# Patient Record
Sex: MALE | Race: WHITE | NOT HISPANIC OR LATINO | Employment: OTHER | URBAN - NONMETROPOLITAN AREA
[De-identification: names, ages, dates, MRNs, and addresses within clinical notes are randomized per-mention and may not be internally consistent; named-entity substitution may affect disease eponyms.]

---

## 2018-06-06 ENCOUNTER — HOSPITAL ENCOUNTER (EMERGENCY)
Facility: HOSPITAL | Age: 73
Discharge: HOME | End: 2018-06-06
Attending: INTERNAL MEDICINE | Admitting: INTERNAL MEDICINE
Payer: COMMERCIAL

## 2018-06-06 ENCOUNTER — APPOINTMENT (OUTPATIENT)
Dept: GENERAL RADIOLOGY | Facility: HOSPITAL | Age: 73
End: 2018-06-06
Attending: INTERNAL MEDICINE
Payer: COMMERCIAL

## 2018-06-06 VITALS
OXYGEN SATURATION: 98 % | TEMPERATURE: 98.9 F | HEART RATE: 68 BPM | DIASTOLIC BLOOD PRESSURE: 91 MMHG | RESPIRATION RATE: 16 BRPM | SYSTOLIC BLOOD PRESSURE: 164 MMHG

## 2018-06-06 DIAGNOSIS — R68.89 SENSATION OF SWOLLEN THROAT: ICD-10-CM

## 2018-06-06 LAB
ALBUMIN SERPL-MCNC: 3.8 G/DL (ref 3.4–5)
ALP SERPL-CCNC: 92 U/L (ref 40–150)
ALT SERPL W P-5'-P-CCNC: 30 U/L (ref 0–70)
ANION GAP SERPL CALCULATED.3IONS-SCNC: 6 MMOL/L (ref 3–14)
AST SERPL W P-5'-P-CCNC: 26 U/L (ref 0–45)
BASOPHILS # BLD AUTO: 0 10E9/L (ref 0–0.2)
BASOPHILS NFR BLD AUTO: 0.4 %
BILIRUB SERPL-MCNC: 0.3 MG/DL (ref 0.2–1.3)
BUN SERPL-MCNC: 16 MG/DL (ref 7–30)
CALCIUM SERPL-MCNC: 8.4 MG/DL (ref 8.5–10.1)
CHLORIDE SERPL-SCNC: 105 MMOL/L (ref 94–109)
CK SERPL-CCNC: 212 U/L (ref 30–300)
CO2 SERPL-SCNC: 25 MMOL/L (ref 20–32)
CREAT SERPL-MCNC: 0.96 MG/DL (ref 0.66–1.25)
CRP SERPL-MCNC: <2.9 MG/L (ref 0–8)
DIFFERENTIAL METHOD BLD: NORMAL
EOSINOPHIL # BLD AUTO: 0.2 10E9/L (ref 0–0.7)
EOSINOPHIL NFR BLD AUTO: 2.1 %
ERYTHROCYTE [DISTWIDTH] IN BLOOD BY AUTOMATED COUNT: 13.2 % (ref 10–15)
GFR SERPL CREATININE-BSD FRML MDRD: 77 ML/MIN/1.7M2
GLUCOSE SERPL-MCNC: 111 MG/DL (ref 70–99)
HCT VFR BLD AUTO: 41.6 % (ref 40–53)
HGB BLD-MCNC: 14.7 G/DL (ref 13.3–17.7)
IMM GRANULOCYTES # BLD: 0 10E9/L (ref 0–0.4)
IMM GRANULOCYTES NFR BLD: 0.1 %
INR PPP: 2.71 (ref 0.8–1.2)
LACTATE SERPL-SCNC: 0.6 MMOL/L (ref 0.4–2)
LYMPHOCYTES # BLD AUTO: 1.9 10E9/L (ref 0.8–5.3)
LYMPHOCYTES NFR BLD AUTO: 19.8 %
MCH RBC QN AUTO: 31.8 PG (ref 26.5–33)
MCHC RBC AUTO-ENTMCNC: 35.3 G/DL (ref 31.5–36.5)
MCV RBC AUTO: 90 FL (ref 78–100)
MONOCYTES # BLD AUTO: 0.8 10E9/L (ref 0–1.3)
MONOCYTES NFR BLD AUTO: 8.3 %
NEUTROPHILS # BLD AUTO: 6.6 10E9/L (ref 1.6–8.3)
NEUTROPHILS NFR BLD AUTO: 69.3 %
NRBC # BLD AUTO: 0 10*3/UL
NRBC BLD AUTO-RTO: 0 /100
PLATELET # BLD AUTO: 220 10E9/L (ref 150–450)
POTASSIUM SERPL-SCNC: 4.5 MMOL/L (ref 3.4–5.3)
PROT SERPL-MCNC: 7.8 G/DL (ref 6.8–8.8)
RBC # BLD AUTO: 4.62 10E12/L (ref 4.4–5.9)
SODIUM SERPL-SCNC: 136 MMOL/L (ref 133–144)
TROPONIN I SERPL-MCNC: <0.015 UG/L (ref 0–0.04)
WBC # BLD AUTO: 9.6 10E9/L (ref 4–11)

## 2018-06-06 PROCEDURE — 99285 EMERGENCY DEPT VISIT HI MDM: CPT | Mod: 25

## 2018-06-06 PROCEDURE — 86140 C-REACTIVE PROTEIN: CPT | Performed by: INTERNAL MEDICINE

## 2018-06-06 PROCEDURE — 25000128 H RX IP 250 OP 636: Performed by: INTERNAL MEDICINE

## 2018-06-06 PROCEDURE — 83605 ASSAY OF LACTIC ACID: CPT | Performed by: INTERNAL MEDICINE

## 2018-06-06 PROCEDURE — 80053 COMPREHEN METABOLIC PANEL: CPT | Performed by: INTERNAL MEDICINE

## 2018-06-06 PROCEDURE — 96374 THER/PROPH/DIAG INJ IV PUSH: CPT

## 2018-06-06 PROCEDURE — 82550 ASSAY OF CK (CPK): CPT | Performed by: INTERNAL MEDICINE

## 2018-06-06 PROCEDURE — 85610 PROTHROMBIN TIME: CPT | Performed by: INTERNAL MEDICINE

## 2018-06-06 PROCEDURE — 71046 X-RAY EXAM CHEST 2 VIEWS: CPT | Mod: TC

## 2018-06-06 PROCEDURE — 93005 ELECTROCARDIOGRAM TRACING: CPT

## 2018-06-06 PROCEDURE — 93010 ELECTROCARDIOGRAM REPORT: CPT | Performed by: INTERNAL MEDICINE

## 2018-06-06 PROCEDURE — 36415 COLL VENOUS BLD VENIPUNCTURE: CPT | Performed by: INTERNAL MEDICINE

## 2018-06-06 PROCEDURE — 84484 ASSAY OF TROPONIN QUANT: CPT | Performed by: INTERNAL MEDICINE

## 2018-06-06 PROCEDURE — 99284 EMERGENCY DEPT VISIT MOD MDM: CPT | Mod: 25

## 2018-06-06 PROCEDURE — 85025 COMPLETE CBC W/AUTO DIFF WBC: CPT | Performed by: INTERNAL MEDICINE

## 2018-06-06 PROCEDURE — 99285 EMERGENCY DEPT VISIT HI MDM: CPT | Performed by: INTERNAL MEDICINE

## 2018-06-06 RX ORDER — METHYLPREDNISOLONE SODIUM SUCCINATE 125 MG/2ML
125 INJECTION, POWDER, LYOPHILIZED, FOR SOLUTION INTRAMUSCULAR; INTRAVENOUS ONCE
Status: COMPLETED | OUTPATIENT
Start: 2018-06-06 | End: 2018-06-06

## 2018-06-06 RX ORDER — PREDNISONE 20 MG/1
20 TABLET ORAL DAILY
Qty: 3 TABLET | Refills: 0 | Status: SHIPPED | OUTPATIENT
Start: 2018-06-06 | End: 2018-06-09

## 2018-06-06 RX ADMIN — METHYLPREDNISOLONE SODIUM SUCCINATE 125 MG: 125 INJECTION, POWDER, FOR SOLUTION INTRAMUSCULAR; INTRAVENOUS at 00:44

## 2018-06-06 NOTE — ED AVS SNAPSHOT
[facility]    [address] [address]    Phone:  [phone]                                       Tavo Sanchez   MRN: [id number]    Department:  [facility]   Date of Visit:  6/6/2018           Patient Information     Date Of Birth          1945        Your diagnoses for this visit were:     Sensation of swollen throat        You were seen by Virgilio Charles MD.      Follow-up Information     Schedule an appointment as soon as possible for a visit with Ryan Núñez MD.    Specialty:  Family Practice    Contact information:    [facility]  [address] [address]  [phone]          Discharge Instructions         Symptoms With Uncertain Cause (Adult)  You have been examined, and tests may have been done. However, the exact cause of your symptoms is still not certain. Watch for any new symptoms or worsening of your condition. Another exam or more testing at a later time may be needed. Unless told otherwise, you can go back to your normal routine. Continue to take prescribed medicines as directed. Contact your healthcare provider if you have questions or concerns.   Follow-up care  Follow up with your healthcare provider if your symptoms do not begin to improve in the next few days, or as advised by our staff.   When to seek medical advice  Call your healthcare provider if your symptoms get worse or if new symptoms appear.  Date Last Reviewed: 10/1/2017    2812-8743 [employer]. [address] [place] [address], [place] [address]. All rights reserved. This information is not intended as a substitute for professional medical care. Always follow your healthcare professional's instructions.          [facility] Scheduling Hotline     To schedule an appointment at [facility], please call [phone]. If you don't have a family doctor or clinic, we will help you find one. [facility] clinics are conveniently located to serve the needs of you  and your family.           Review of your medicines      START taking        Dose / Directions Last dose taken    predniSONE 20 MG tablet   Commonly known as:  DELTASONE   Dose:  20 mg   Quantity:  3 tablet        Take 1 tablet (20 mg) by mouth daily for 3 days   Refills:  0          Our records show that you are taking the medicines listed below. If these are incorrect, please call your family doctor or clinic.        Dose / Directions Last dose taken    aspirin 81 MG tablet        Take  by mouth daily.   Refills:  0        ATENOLOL PO   Dose:  100 mg        Take 100 mg by mouth 2 times daily.   Refills:  0        COUMADIN PO   Dose:  7 mg        Take 7 mg by mouth.   Refills:  0        DILTIAZEM HCL   Dose:  300 mg        300 mg daily.   Refills:  0        LISINOPRIL PO   Dose:  10 mg        Take 10 mg by mouth daily.   Refills:  0        ZOCOR PO   Dose:  20 mg        Take 20 mg by mouth At Bedtime.   Refills:  0                Prescriptions were sent or printed at these locations (1 Prescription)                   Other Prescriptions                Printed at Department/Unit printer (1 of 1)         predniSONE (DELTASONE) 20 MG tablet                Procedures and tests performed during your visit     CBC with platelets differential    CK total    CRP inflammation    Comprehensive metabolic panel    INR    Lactic acid    Troponin I    XR Chest 2 Views      Orders Needing Specimen Collection     None      Pending Results     Date and Time Order Name Status Description    6/6/2018 0016 XR Chest 2 Views In process             Pending Culture Results     No orders found from 6/4/2018 to 6/7/2018.            Thank you for choosing [facility]       Thank you for choosing [facility] for your care. Our goal is always to provide you with excellent care. Hearing back from our patients is one way we can continue to improve our services. Please take a few minutes to complete the written survey that you may receive in the mail  "after you visit with us. Thank you!        [employer] Information     [employer] lets you send messages to your doctor, view your test results, renew your prescriptions, schedule appointments and more. To sign up, go to [email or web address] . Click on \"Log in\" on the left side of the screen, which will take you to the Welcome page. Then click on \"Sign up Now\" on the right side of the page.     You will be asked to enter the access code listed below, as well as some personal information. Please follow the directions to create your username and password.     Your access code is: [email or web address]  Expires: 2018  1:48 AM     Your access code will  in 90 days. If you need help or a new code, please call your [facility] clinic or [phone].        Care EveryWhere ID     This is your Care EveryWhere ID. This could be used by other organizations to access your [facility] medical records  [id number]        Equal Access to Services     MILES [facility] [place] [facility] CenterEMMANUELLE : Hadii joanie osorio hadasho Sodenisali, waaxda luqadaha, qaybta kaalmada adeegyajustina, kaylee ireland . So [facility] [phone].    ATENCIÓN: Si habla español, tiene a chicas disposición servicios gratuitos de asistencia lingüística. Llame al [phone].    We comply with applicable federal civil rights laws and [place] laws. We do not discriminate on the basis of race, color, national origin, age, disability, sex, sexual orientation, or gender identity.            After Visit Summary       This is your record. Keep this with you and show to your community pharmacist(s) and doctor(s) at your next visit.                  "

## 2018-06-06 NOTE — ED AVS SNAPSHOT
[facility]    [address] [place] [address]    Phone:  [phone]                                       Tavo Sanchez   MRN: [id number]    Department:  [facility]   Date of Visit:  6/6/2018           After Visit Summary Signature Page     I have received my discharge instructions, and my questions have been answered. I have discussed any challenges I see with this plan with the nurse or doctor.    ..........................................................................................................................................  Patient/Patient Representative Signature      ..........................................................................................................................................  Patient Representative Print Name and Relationship to Patient    ..................................................               ................................................  Date                                            Time    ..........................................................................................................................................  Reviewed by Signature/Title    ...................................................              ..............................................  Date                                                            Time

## 2018-06-06 NOTE — DISCHARGE INSTRUCTIONS
Symptoms With Uncertain Cause (Adult)  You have been examined, and tests may have been done. However, the exact cause of your symptoms is still not certain. Watch for any new symptoms or worsening of your condition. Another exam or more testing at a later time may be needed. Unless told otherwise, you can go back to your normal routine. Continue to take prescribed medicines as directed. Contact your healthcare provider if you have questions or concerns.   Follow-up care  Follow up with your healthcare provider if your symptoms do not begin to improve in the next few days, or as advised by our staff.   When to seek medical advice  Call your healthcare provider if your symptoms get worse or if new symptoms appear.  Date Last Reviewed: 10/1/2017    [phone] [employer]. [address] [place] [address], [place] [address]. All rights reserved. This information is not intended as a substitute for professional medical care. Always follow your healthcare professional's instructions.

## 2018-06-06 NOTE — ED AVS SNAPSHOT
[facility]    [address] [address]    Phone:  [phone]                                       Tavo Sanchez   MRN: [id number]    Department[facility]  [facility]   Date of Visit:  6/6/2018           Patient Information     Date Of Birth          1945        Your diagnoses for this visit were:     Sensation of swollen throat        You were seen by Virgilio Charles MD.      Follow-up Information     Schedule an appointment as soon as possible for a visit with Ryan Núñez MD.    Specialty:  Family Practice    Contact information:    [facility]  [address] [address]  [phone]          Discharge Instructions         Symptoms With Uncertain Cause (Adult)  You have been examined, and tests may have been done. However, the exact cause of your symptoms is still not certain. Watch for any new symptoms or worsening of your condition. Another exam or more testing at a later time may be needed. Unless told otherwise, you can go back to your normal routine. Continue to take prescribed medicines as directed. Contact your healthcare provider if you have questions or concerns.   Follow-up care  Follow up with your healthcare provider if your symptoms do not begin to improve in the next few days, or as advised by our staff.   When to seek medical advice  Call your healthcare provider if your symptoms get worse or if new symptoms appear.  Date Last Reviewed: 10/1/2017    5484-1249 [employer]. [address] [place] [address], [place] [address]. All rights reserved. This information is not intended as a substitute for professional medical care. Always follow your healthcare professional's instructions.             Review of your medicines      START taking        Dose / Directions Last dose taken    predniSONE 20 MG tablet   Commonly known as:  DELTASONE   Dose:  20 mg   Quantity:  3 tablet        Take 1 tablet (20 mg) by mouth daily for 3 days   Refills:  " 0          Our records show that you are taking the medicines listed below. If these are incorrect, please call your family doctor or clinic.        Dose / Directions Last dose taken    aspirin 81 MG tablet        Take  by mouth daily.   Refills:  0        ATENOLOL PO   Dose:  100 mg        Take 100 mg by mouth 2 times daily.   Refills:  0        COUMADIN PO   Dose:  7 mg        Take 7 mg by mouth.   Refills:  0        DILTIAZEM HCL   Dose:  300 mg        300 mg daily.   Refills:  0        LISINOPRIL PO   Dose:  10 mg        Take 10 mg by mouth daily.   Refills:  0        ZOCOR PO   Dose:  20 mg        Take 20 mg by mouth At Bedtime.   Refills:  0                Prescriptions were sent or printed at these locations (1 Prescription)                   Other Prescriptions                Printed at Department/Unit printer (1 of 1)         predniSONE (DELTASONE) 20 MG tablet                Procedures and tests performed during your visit     CBC with platelets differential    CK total    CRP inflammation    Comprehensive metabolic panel    INR    Lactic acid    Troponin I    XR Chest 2 Views      Orders Needing Specimen Collection     None      Pending Results     Date and Time Order Name Status Description    6/6/2018 0016 XR Chest 2 Views In process             Pending Culture Results     No orders found from 6/4/2018 to 6/7/2018.            Thank you for choosing [facility]       Thank you for choosing [facility] for your care. Our goal is always to provide you with excellent care. Hearing back from our patients is one way we can continue to improve our services. Please take a few minutes to complete the written survey that you may receive in the mail after you visit with us. Thank you!        [employer] Information     [employer] lets you send messages to your doctor, view your test results, renew your prescriptions, schedule appointments and more. To sign up, go to [email or web address] . Click on \"Log in\" on the left " "side of the screen, which will take you to the Welcome page. Then click on \"Sign up Now\" on the right side of the page.     You will be asked to enter the access code listed below, as well as some personal information. Please follow the directions to create your username and password.     Your access code is: [email or web address]  Expires: 2018  1:48 AM     Your access code will  in 90 days. If you need help or a new code, please call your [facility] clinic or [phone].        Care EveryWhere ID     This is your Care EveryWhere ID. This could be used by other organizations to access your [facility] medical records  [id number]        Equal Access to Services     MILES CARRANZA : Dino Mosqueda, alisson villeda, debra kwon, kaylee bush. So [facility] [phone].    ATENCIÓN: Si habla español, tiene a chicas disposición servicios gratuitos de asistencia lingüística. Llame al [phone].    We comply with applicable federal civil rights laws and [place] laws. We do not discriminate on the basis of race, color, national origin, age, disability, sex, sexual orientation, or gender identity.            After Visit Summary       This is your record. Keep this with you and show to your community pharmacist(s) and doctor(s) at your next visit.                  "

## 2018-06-18 ASSESSMENT — ENCOUNTER SYMPTOMS
HEADACHES: 0
SLEEP DISTURBANCE: 0
LIGHT-HEADEDNESS: 0
VOICE CHANGE: 0
FREQUENCY: 0
ABDOMINAL PAIN: 0
FEVER: 0
NERVOUS/ANXIOUS: 1
WEAKNESS: 0
WHEEZING: 0
MYALGIAS: 0
NAUSEA: 0
FLANK PAIN: 0
DIAPHORESIS: 0
PALPITATIONS: 0
SORE THROAT: 1
DIZZINESS: 0
SHORTNESS OF BREATH: 0
ANAL BLEEDING: 0
CHEST TIGHTNESS: 0
BACK PAIN: 0
ABDOMINAL DISTENTION: 0
DYSURIA: 0
NECK PAIN: 0
CHILLS: 0
BLOOD IN STOOL: 0
VOMITING: 0
TROUBLE SWALLOWING: 1
COLOR CHANGE: 0
NUMBNESS: 0
COUGH: 0
CONFUSION: 0

## 2018-06-18 NOTE — ED PROVIDER NOTES
History     Chief Complaint   Patient presents with     Shortness of Breath     Since 1800 yesterday     Patient is a 72 year old male presenting with sore throat. The history is provided by the patient.   Pharyngitis   Location:  Generalized  Quality:  Aching  Onset quality:  Gradual  Timing:  Intermittent  Chronicity:  Recurrent  Associated symptoms: trouble swallowing    Associated symptoms: no abdominal pain, no chest pain, no chills, no cough, no fever, no headaches, no rash, no shortness of breath and no voice change        Problem List:    There are no active problems to display for this patient.       Past Medical History:    Past Medical History:   Diagnosis Date     Atrial fibrillation (H)      Unspecified cerebral artery occlusion with cerebral infarction 12/27/2012       Past Surgical History:    No past surgical history on file.    Family History:    No family history on file.    Social History:  Marital Status:   [2]  Social History   Substance Use Topics     Smoking status: Former Smoker     Smokeless tobacco: Former User     Alcohol use No        Medications:      aspirin 81 MG tablet   ATENOLOL PO   DILTIAZEM HCL   LISINOPRIL PO   Simvastatin (ZOCOR PO)   Warfarin Sodium (COUMADIN PO)         Review of Systems   Constitutional: Negative for chills, diaphoresis and fever.   HENT: Positive for sore throat and trouble swallowing. Negative for voice change.    Eyes: Negative for visual disturbance.   Respiratory: Negative for cough, chest tightness, shortness of breath and wheezing.    Cardiovascular: Negative for chest pain, palpitations and leg swelling.   Gastrointestinal: Negative for abdominal distention, abdominal pain, anal bleeding, blood in stool, nausea and vomiting.   Genitourinary: Negative for decreased urine volume, dysuria, flank pain and frequency.   Musculoskeletal: Negative for back pain, gait problem, myalgias and neck pain.   Skin: Negative for color change, pallor and rash.    Neurological: Negative for dizziness, syncope, weakness, light-headedness, numbness and headaches.   Psychiatric/Behavioral: Negative for confusion, sleep disturbance and suicidal ideas. The patient is nervous/anxious.        Physical Exam   BP: (!) 170/106  Pulse: 72  Heart Rate: 71  Temp: 98  F (36.7  C)  Resp: 16  SpO2: 96 %      Physical Exam   Constitutional: He is oriented to person, place, and time. He appears well-developed and well-nourished.   HENT:   Head: Normocephalic and atraumatic.   Mouth/Throat: Uvula is midline. No uvula swelling. No oropharyngeal exudate or posterior oropharyngeal edema.   Eyes: Conjunctivae are normal. Pupils are equal, round, and reactive to light.   Neck: Normal range of motion. Neck supple. No JVD present. No tracheal deviation present. No thyromegaly present.   Cardiovascular: Normal rate, regular rhythm, normal heart sounds and intact distal pulses.  Exam reveals no gallop and no friction rub.    No murmur heard.  Pulmonary/Chest: Effort normal and breath sounds normal. No stridor. No respiratory distress. He has no wheezes. He has no rales. He exhibits no tenderness.   Abdominal: Soft. Bowel sounds are normal. He exhibits no distension and no mass. There is no tenderness. There is no rebound and no guarding.   Musculoskeletal: Normal range of motion. He exhibits no edema or tenderness.   Lymphadenopathy:     He has no cervical adenopathy.   Neurological: He is alert and oriented to person, place, and time.   Skin: Skin is warm and dry. No rash noted. No erythema. No pallor.   Psychiatric: His behavior is normal.   Nursing note and vitals reviewed.      ED Course     ED Course     Procedures                   No results found for this or any previous visit (from the past 24 hour(s)).    Medications   methylPREDNISolone sodium succinate (solu-MEDROL) injection 125 mg (125 mg Intravenous Given 6/6/18 0044)       Assessments & Plan (with Medical Decision Making)   Feeling of  swelling in throat  No  respiratroy distress, no stridor  unknown etiolgy   IV steroid , followed with PO , short term, low dose  Fu with PCP      I have reviewed the nursing notes.    I have reviewed the findings, diagnosis, plan and need for follow up with the patient.      Discharge Medication List as of 6/6/2018  1:49 AM      START taking these medications    Details   predniSONE (DELTASONE) 20 MG tablet Take 1 tablet (20 mg) by mouth daily for 3 days, Disp-3 tablet, R-0, Local Print             Final diagnoses:   Sensation of swollen throat       6/5/2018   [facility]     Virgilio Charles MD  06/18/18 6823

## 2020-07-11 ENCOUNTER — HOSPITAL ENCOUNTER (EMERGENCY)
Facility: HOSPITAL | Age: 75
Discharge: HOME | End: 2020-07-11
Attending: PHYSICIAN ASSISTANT | Admitting: PHYSICIAN ASSISTANT
Payer: COMMERCIAL

## 2020-07-11 VITALS
OXYGEN SATURATION: 97 % | TEMPERATURE: 98.8 F | DIASTOLIC BLOOD PRESSURE: 91 MMHG | RESPIRATION RATE: 18 BRPM | SYSTOLIC BLOOD PRESSURE: 122 MMHG

## 2020-07-11 DIAGNOSIS — R31.0 GROSS HEMATURIA: Primary | ICD-10-CM

## 2020-07-11 LAB
ALBUMIN UR-MCNC: 10 MG/DL
APPEARANCE UR: CLEAR
BACTERIA #/AREA URNS HPF: ABNORMAL /HPF
BASOPHILS # BLD AUTO: 0 10E9/L (ref 0–0.2)
BASOPHILS NFR BLD AUTO: 0.7 %
BILIRUB UR QL STRIP: NEGATIVE
COLOR UR AUTO: YELLOW
DIFFERENTIAL METHOD BLD: NORMAL
EOSINOPHIL # BLD AUTO: 0.2 10E9/L (ref 0–0.7)
EOSINOPHIL NFR BLD AUTO: 2.5 %
ERYTHROCYTE [DISTWIDTH] IN BLOOD BY AUTOMATED COUNT: 13.7 % (ref 10–15)
GLUCOSE UR STRIP-MCNC: NEGATIVE MG/DL
HCT VFR BLD AUTO: 43.8 % (ref 40–53)
HGB BLD-MCNC: 15 G/DL (ref 13.3–17.7)
HGB UR QL STRIP: ABNORMAL
IMM GRANULOCYTES # BLD: 0 10E9/L (ref 0–0.4)
IMM GRANULOCYTES NFR BLD: 0.3 %
INR PPP: 2.43 (ref 0.86–1.14)
KETONES UR STRIP-MCNC: NEGATIVE MG/DL
LEUKOCYTE ESTERASE UR QL STRIP: NEGATIVE
LYMPHOCYTES # BLD AUTO: 1.5 10E9/L (ref 0.8–5.3)
LYMPHOCYTES NFR BLD AUTO: 24.5 %
MCH RBC QN AUTO: 30.6 PG (ref 26.5–33)
MCHC RBC AUTO-ENTMCNC: 34.2 G/DL (ref 31.5–36.5)
MCV RBC AUTO: 89 FL (ref 78–100)
MONOCYTES # BLD AUTO: 0.6 10E9/L (ref 0–1.3)
MONOCYTES NFR BLD AUTO: 9.5 %
MUCOUS THREADS #/AREA URNS LPF: PRESENT /LPF
NEUTROPHILS # BLD AUTO: 3.7 10E9/L (ref 1.6–8.3)
NEUTROPHILS NFR BLD AUTO: 62.5 %
NITRATE UR QL: NEGATIVE
NRBC # BLD AUTO: 0 10*3/UL
NRBC BLD AUTO-RTO: 0 /100
PH UR STRIP: 5 PH (ref 4.7–8)
PLATELET # BLD AUTO: 220 10E9/L (ref 150–450)
RBC # BLD AUTO: 4.9 10E12/L (ref 4.4–5.9)
RBC #/AREA URNS AUTO: 69 /HPF (ref 0–2)
SOURCE: ABNORMAL
SP GR UR STRIP: 1.02 (ref 1–1.03)
UROBILINOGEN UR STRIP-MCNC: 2 MG/DL (ref 0–2)
WBC # BLD AUTO: 5.9 10E9/L (ref 4–11)
WBC #/AREA URNS AUTO: 2 /HPF (ref 0–5)

## 2020-07-11 PROCEDURE — 85610 PROTHROMBIN TIME: CPT

## 2020-07-11 PROCEDURE — 99283 EMERGENCY DEPT VISIT LOW MDM: CPT | Mod: Z6 | Performed by: PHYSICIAN ASSISTANT

## 2020-07-11 PROCEDURE — 36415 COLL VENOUS BLD VENIPUNCTURE: CPT | Performed by: PHYSICIAN ASSISTANT

## 2020-07-11 PROCEDURE — 87086 URINE CULTURE/COLONY COUNT: CPT | Performed by: PHYSICIAN ASSISTANT

## 2020-07-11 PROCEDURE — 81001 URINALYSIS AUTO W/SCOPE: CPT | Performed by: PHYSICIAN ASSISTANT

## 2020-07-11 PROCEDURE — 99283 EMERGENCY DEPT VISIT LOW MDM: CPT

## 2020-07-11 PROCEDURE — 85025 COMPLETE CBC W/AUTO DIFF WBC: CPT | Performed by: PHYSICIAN ASSISTANT

## 2020-07-11 RX ORDER — MULTIVIT WITH MINERALS/LUTEIN
1000 TABLET ORAL DAILY
COMMUNITY

## 2020-07-11 ASSESSMENT — ENCOUNTER SYMPTOMS
CHILLS: 0
ABDOMINAL PAIN: 0
NAUSEA: 0
FEVER: 0
FLANK PAIN: 0
HEMATURIA: 1
BRUISES/BLEEDS EASILY: 1
BACK PAIN: 0
DIFFICULTY URINATING: 0
DYSURIA: 0
SHORTNESS OF BREATH: 0

## 2020-07-11 NOTE — ED NOTES
"Pt here with wife with complaints of hematuria x2 days. Pt states he has never experienced blood in his urine before. He takes coumadin for A-Fib. Last INR check was last week and pt states he was 3.3 at that time so he \"ate 2 salads and decreased his dose for one day.\" Pt states he noticed blood in his urine yesterday and then it cleared up and then returned intermittently t/o the day. This morning he states urine looked like \"red wine.\" VSS. UA sent to lab.   "

## 2020-07-11 NOTE — ED PROVIDER NOTES
History     Chief Complaint   Patient presents with     Hematuria     , takes Coumadin T,R,S,S= 7.5mg   MWF= 5mg     The history is provided by the patient.     Tavo Sanchez is a 74 year old male who presented to the emergency department ambulatory along with family for evaluation of gross hematuria.  Began 2 days ago and has cleared.  Denies any pain.  Denies any other lower urinary tract symptoms.  Denies any fevers or chills.  Past history is most significant for Coumadin therapy secondary to atrial fibrillation.    Allergies:  No Known Allergies    Problem List:    There are no active problems to display for this patient.       Past Medical History:    Past Medical History:   Diagnosis Date     Atrial fibrillation (H)      Unspecified cerebral artery occlusion with cerebral infarction 12/27/2012       Past Surgical History:    No past surgical history on file.    Family History:    No family history on file.    Social History:  Marital Status:   [2]  Social History     Tobacco Use     Smoking status: Former Smoker     Smokeless tobacco: Former User   Substance Use Topics     Alcohol use: No     Drug use: Not on file        Medications:    ATENOLOL PO  DILTIAZEM HCL  LISINOPRIL PO  Simvastatin (ZOCOR PO)  vitamin C (ASCORBIC ACID) 1000 MG TABS  Warfarin Sodium (COUMADIN PO)          Review of Systems   Constitutional: Negative for chills and fever.   Respiratory: Negative for shortness of breath.    Gastrointestinal: Negative for abdominal pain and nausea.   Genitourinary: Positive for hematuria. Negative for difficulty urinating, dysuria, flank pain, scrotal swelling and testicular pain.   Musculoskeletal: Negative for back pain.   Hematological: Bruises/bleeds easily.       Physical Exam   BP: 136/87  Heart Rate: 86  Temp: 98.1  F (36.7  C)  Resp: 20  SpO2: 98 %      Physical Exam  Vitals signs and nursing note reviewed.   Constitutional:       General: He is not in acute distress.     Appearance:  Normal appearance. He is obese. He is not ill-appearing, toxic-appearing or diaphoretic.      Comments: Pleasant and talkative 74-year-old male found in no distress.   Pulmonary:      Effort: Pulmonary effort is normal.   Abdominal:      General: There is no distension.      Palpations: Abdomen is soft.      Tenderness: There is no abdominal tenderness. There is no guarding.   Skin:     General: Skin is warm and dry.      Capillary Refill: Capillary refill takes less than 2 seconds.   Neurological:      General: No focal deficit present.      Mental Status: He is alert and oriented to person, place, and time.   Psychiatric:         Mood and Affect: Mood normal.         ED Course        Procedures               Critical Care time:  none               Results for orders placed or performed during the hospital encounter of 07/11/20 (from the past 24 hour(s))   UA reflex to Microscopic   Result Value Ref Range    Color Urine Yellow     Appearance Urine Clear     Glucose Urine Negative NEG^Negative mg/dL    Bilirubin Urine Negative NEG^Negative    Ketones Urine Negative NEG^Negative mg/dL    Specific Gravity Urine 1.025 1.003 - 1.035    Blood Urine Small (A) NEG^Negative    pH Urine 5.0 4.7 - 8.0 pH    Protein Albumin Urine 10 (A) NEG^Negative mg/dL    Urobilinogen mg/dL 2.0 0.0 - 2.0 mg/dL    Nitrite Urine Negative NEG^Negative    Leukocyte Esterase Urine Negative NEG^Negative    Source Midstream Urine     RBC Urine 69 (H) 0 - 2 /HPF    WBC Urine 2 0 - 5 /HPF    Bacteria Urine Few (A) NEG^Negative /HPF    Mucous Urine Present (A) NEG^Negative /LPF   CBC with platelets differential   Result Value Ref Range    WBC 5.9 4.0 - 11.0 10e9/L    RBC Count 4.90 4.4 - 5.9 10e12/L    Hemoglobin 15.0 13.3 - 17.7 g/dL    Hematocrit 43.8 40.0 - 53.0 %    MCV 89 78 - 100 fl    MCH 30.6 26.5 - 33.0 pg    MCHC 34.2 31.5 - 36.5 g/dL    RDW 13.7 10.0 - 15.0 %    Platelet Count 220 150 - 450 10e9/L    Diff Method Automated Method     %  Neutrophils 62.5 %    % Lymphocytes 24.5 %    % Monocytes 9.5 %    % Eosinophils 2.5 %    % Basophils 0.7 %    % Immature Granulocytes 0.3 %    Nucleated RBCs 0 0 /100    Absolute Neutrophil 3.7 1.6 - 8.3 10e9/L    Absolute Lymphocytes 1.5 0.8 - 5.3 10e9/L    Absolute Monocytes 0.6 0.0 - 1.3 10e9/L    Absolute Eosinophils 0.2 0.0 - 0.7 10e9/L    Absolute Basophils 0.0 0.0 - 0.2 10e9/L    Abs Immature Granulocytes 0.0 0 - 0.4 10e9/L    Absolute Nucleated RBC 0.0    INR   Result Value Ref Range    INR 2.43 (H) 0.86 - 1.14       Medications - No data to display    Assessments & Plan (with Medical Decision Making)   Findings as above.  No emergent concerns of the emergency department.  Postvoid bladder scan is 0.  INR is therapeutic.  Urology referral placed.  Return here as needed.  See discharge structures.  Patient voiced complete understanding was happy and agreeable.    This document was prepared using a combination of typing and voice generated software.  While every attempt was made for accuracy, spelling and grammatical errors may exist.    I have reviewed the nursing notes.    I have reviewed the findings, diagnosis, plan and need for follow up with the patient.       New Prescriptions    No medications on file       Final diagnoses:   Gross hematuria       7/11/2020   [facility]     Ragini Bourne PA-C  07/11/20 6939

## 2020-07-11 NOTE — ED AVS SNAPSHOT
[facility]  [address] [address]  Phone:  [phone]                                    Tavo Sanchez   MRN: [id number]    Department:  [facility]   Date of Visit:  7/11/2020           After Visit Summary Signature Page    I have received my discharge instructions, and my questions have been answered. I have discussed any challenges I see with this plan with the nurse or doctor.    ..........................................................................................................................................  Patient/Patient Representative Signature      ..........................................................................................................................................  Patient Representative Print Name and Relationship to Patient    ..................................................               ................................................  Date                                   Time    ..........................................................................................................................................  Reviewed by Signature/Title    ...................................................              ..............................................  Date                                               Time          22EPIC Rev 08/18

## 2020-07-11 NOTE — ED NOTES
Pt discharged at this time with wife, instructed to return with any worsening in symptoms or increased bleeding. Pt verbalizes understanding.

## 2020-07-13 LAB
BACTERIA SPEC CULT: NO GROWTH
SPECIMEN SOURCE: NORMAL

## 2020-07-22 ENCOUNTER — OFFICE VISIT (OUTPATIENT)
Dept: UROLOGY | Facility: OTHER | Age: 75
End: 2020-07-22
Attending: PHYSICIAN ASSISTANT
Payer: COMMERCIAL

## 2020-07-22 VITALS
RESPIRATION RATE: 24 BRPM | OXYGEN SATURATION: 97 % | DIASTOLIC BLOOD PRESSURE: 82 MMHG | HEART RATE: 90 BPM | TEMPERATURE: 97.7 F | SYSTOLIC BLOOD PRESSURE: 132 MMHG

## 2020-07-22 DIAGNOSIS — R31.0 GROSS HEMATURIA: ICD-10-CM

## 2020-07-22 PROCEDURE — 99203 OFFICE O/P NEW LOW 30 MIN: CPT | Performed by: UROLOGY

## 2020-07-22 PROCEDURE — G0463 HOSPITAL OUTPT CLINIC VISIT: HCPCS

## 2020-07-22 ASSESSMENT — PAIN SCALES - GENERAL: PAINLEVEL: NO PAIN (0)

## 2020-07-22 NOTE — LETTER
7/22/2020       RE: Tavo Sanchez  [address]  [address]  [facility] [place], [place] [address]     Dear Colleague,    Thank you for referring your patient, Tavo Sanchez, to the [facility] at [facility]. Please see a copy of my visit note below.      History     Chief Complaint:    Consult and Hematuria (or possible in seman? Is on Coumadin)      HPI   Tavo Sanchez is a 74 year old male who presents with a history of gross hematuria.  Kota said that he had intercourse one evening and then the next morning he woke up and he voided and his urine was clear and then later that the he voided again and he had visible blood in his urine.  He ended up going to urgent care and his urinalysis did have a significant amount of microscopic blood but no evidence of infection.  Not have any symptoms with that.  He voids about every 3-4 hours during the day gets up 0-1 time at night and he is never seen blood or had an infection before.  He does have a history of smoking 30+ years and he is on Coumadin for stroke that he suffered in December 2012.    Allergies:    No Known Allergies     Medications:      ATENOLOL PO  DILTIAZEM HCL  LISINOPRIL PO  Simvastatin (ZOCOR PO)  vitamin C (ASCORBIC ACID) 1000 MG TABS  Warfarin Sodium (COUMADIN PO)        Problem List:      There are no active problems to display for this patient.       Past Medical History:      Past Medical History:   Diagnosis Date     Atrial fibrillation (H)      Unspecified cerebral artery occlusion with cerebral infarction 12/27/2012       Past Surgical History:      Past Surgical History:   Procedure Laterality Date     APPENDECTOMY OPEN N/A        Family History:      History reviewed. No pertinent family history.    Social History:    Marital Status:   [2]  Social History     Tobacco Use     Smoking status: Former Smoker     Smokeless tobacco: Former User   Substance Use Topics     Alcohol use: No     Drug use:  None        Review of Systems   All other systems reviewed and are negative.        Physical Exam   Vitals:  /82   Pulse 90   Temp 97.7  F (36.5  C) (Tympanic)   Resp 24   SpO2 97%       Physical Exam  Constitutional:       Appearance: Normal appearance. He is obese.   Cardiovascular:      Rate and Rhythm: Rhythm irregular.      Pulses: Normal pulses.   Pulmonary:      Effort: Pulmonary effort is normal.   Abdominal:      General: Abdomen is flat. There is no distension.      Palpations: Abdomen is soft. There is no mass.      Tenderness: There is no abdominal tenderness.      Hernia: No hernia is present.   Genitourinary:     Comments: Penis is circumcised meatus glans shaft of the penis is normal.  Testicles are both descended without any masses supra testicular masses or inguinal hernias.  External rectal area is normal normal rectal tone prostate is about 40 g there is maybe some subtle firmness in the right base but otherwise the prostate feels normal.  Neurological:      Mental Status: He is alert.           Ref Range & Units  11d ago     Color Urine   Yellow       Appearance Urine   Clear       Glucose Urine  NEG^Negative mg/dL  Negative       Bilirubin Urine  NEG^Negative  Negative       Ketones Urine  NEG^Negative mg/dL  Negative       Specific Gravity Urine  1.003 - 1.035  1.025       Blood Urine  NEG^Negative  SmallAbnormal         pH Urine  4.7 - 8.0 pH  5.0       Protein Albumin Urine  NEG^Negative mg/dL  10Abnormal         Urobilinogen mg/dL  0.0 - 2.0 mg/dL  2.0       Nitrite Urine  NEG^Negative  Negative       Leukocyte Esterase Urine  NEG^Negative  Negative       Source   Midstream Urine       RBC Urine  0 - 2 /HPF  69High         WBC Urine  0 - 5 /HPF  2       Bacteria Urine  NEG^Negative /HPF  FewAbnormal         Mucous Urine  NEG^Negative /LPF  PresentAbnormal        Specimen Description  Midstream Urine     Culture Micro  No growth        ImPression: Gross hematuria  Plan   Plan:  Patient needs an upper tract study and a cystoscopy.  [facility] I have written a card with what I recommend at this time and he will discuss that with [facility] and they will make the appropriate referral.  If he decides to have his care here we will get a CT scan of the abdomen and pelvis with IV contrast and we will bring him back for cystoscopy.      No follow-ups on file.    Patti Zamudio MD  [facility] [place]            Again, thank you for allowing me to participate in the care of your patient.      Sincerely,    Patti Zamudio MD

## 2020-07-22 NOTE — PROGRESS NOTES
History     Chief Complaint:    Consult and Hematuria (or possible in seman? Is on Coumadin)      HPI   Tavo Sanchez is a 74 year old male who presents with a history of gross hematuria.  Kota said that he had intercourse one evening and then the next morning he woke up and he voided and his urine was clear and then later that the he voided again and he had visible blood in his urine.  He ended up going to urgent care and his urinalysis did have a significant amount of microscopic blood but no evidence of infection.  Not have any symptoms with that.  He voids about every 3-4 hours during the day gets up 0-1 time at night and he is never seen blood or had an infection before.  He does have a history of smoking 30+ years and he is on Coumadin for stroke that he suffered in December 2012.    Allergies:    No Known Allergies     Medications:      ATENOLOL PO  DILTIAZEM HCL  LISINOPRIL PO  Simvastatin (ZOCOR PO)  vitamin C (ASCORBIC ACID) 1000 MG TABS  Warfarin Sodium (COUMADIN PO)        Problem List:      There are no active problems to display for this patient.       Past Medical History:      Past Medical History:   Diagnosis Date     Atrial fibrillation (H)      Unspecified cerebral artery occlusion with cerebral infarction 12/27/2012       Past Surgical History:      Past Surgical History:   Procedure Laterality Date     APPENDECTOMY OPEN N/A        Family History:      History reviewed. No pertinent family history.    Social History:    Marital Status:   [2]  Social History     Tobacco Use     Smoking status: Former Smoker     Smokeless tobacco: Former User   Substance Use Topics     Alcohol use: No     Drug use: None        Review of Systems   All other systems reviewed and are negative.        Physical Exam   Vitals:  /82   Pulse 90   Temp 97.7  F (36.5  C) (Tympanic)   Resp 24   SpO2 97%       Physical Exam  Constitutional:       Appearance: Normal appearance. He is obese.   Cardiovascular:       Rate and Rhythm: Rhythm irregular.      Pulses: Normal pulses.   Pulmonary:      Effort: Pulmonary effort is normal.   Abdominal:      General: Abdomen is flat. There is no distension.      Palpations: Abdomen is soft. There is no mass.      Tenderness: There is no abdominal tenderness.      Hernia: No hernia is present.   Genitourinary:     Comments: Penis is circumcised meatus glans shaft of the penis is normal.  Testicles are both descended without any masses supra testicular masses or inguinal hernias.  External rectal area is normal normal rectal tone prostate is about 40 g there is maybe some subtle firmness in the right base but otherwise the prostate feels normal.  Neurological:      Mental Status: He is alert.           Ref Range & Units  11d ago     Color Urine   Yellow       Appearance Urine   Clear       Glucose Urine  NEG^Negative mg/dL  Negative       Bilirubin Urine  NEG^Negative  Negative       Ketones Urine  NEG^Negative mg/dL  Negative       Specific Gravity Urine  1.003 - 1.035  1.025       Blood Urine  NEG^Negative  SmallAbnormal         pH Urine  4.7 - 8.0 pH  5.0       Protein Albumin Urine  NEG^Negative mg/dL  10Abnormal         Urobilinogen mg/dL  0.0 - 2.0 mg/dL  2.0       Nitrite Urine  NEG^Negative  Negative       Leukocyte Esterase Urine  NEG^Negative  Negative       Source   Midstream Urine       RBC Urine  0 - 2 /HPF  69High         WBC Urine  0 - 5 /HPF  2       Bacteria Urine  NEG^Negative /HPF  FewAbnormal         Mucous Urine  NEG^Negative /LPF  PresentAbnormal        Specimen Description  Midstream Urine     Culture Micro  No growth        ImPression: Gross hematuria  Plan   Plan: Patient needs an upper tract study and a cystoscopy.  [facility] I have written a card with what I recommend at this time and he will discuss that with [facility] and they will make the appropriate referral.  If he  decides to have his care here we will get a CT scan of the abdomen and pelvis with IV contrast and we will bring him back for cystoscopy.      No follow-ups on file.    Patti Zamudio MD  [facility]

## 2020-07-22 NOTE — NURSING NOTE
"Chief Complaint   Patient presents with     Consult     Hematuria     or possible in seman? Is on Coumadin       Initial /82   Pulse 90   Temp 97.7  F (36.5  C) (Tympanic)   Resp 24   SpO2 97%  Estimated body mass index is 30.41 kg/m  as calculated from the following:    Height as of 3/23/13: 1.727 m (5' 8\").    Weight as of 3/23/13: 90.7 kg (200 lb).  Medication Reconciliation: complete   Review of Systems:    Weight loss:    No     Recent fever/chills:  No   Night sweats:   No  Current skin rash:  No   Recent hair loss:  No  Heat intolerance:  No   Cold intolerance:  No  Chest pain:   No   Palpitations:   No  Shortness of breath:  No   Wheezing:   No  Constipation:    No   Diarrhea:   No   Nausea:   No   Vomiting:   No   Kidney/side pain:  No   Back pain:   No  Frequent headaches:  No   Dizziness:     No  Leg swelling:   No   Calf pain:    No    Parents, brothers or sisters with history of kidney cancer:   No  Parents, brothers or sisters with history of bladder cancer: No    Ruth Damian LPN    "

## 2020-08-05 ENCOUNTER — TRANSFERRED RECORDS (OUTPATIENT)
Dept: HEALTH INFORMATION MANAGEMENT | Facility: CLINIC | Age: 75
End: 2020-08-05

## 2020-08-05 LAB
ALT SERPL-CCNC: 31 U/L (ref 13–61)
AST SERPL-CCNC: 27 U/L (ref 15–37)
CHOLEST SERPL-MCNC: 146 MG/DL
CREAT SERPL-MCNC: 1 MG/DL (ref 0.7–1.2)
GFR SERPL CREATININE-BSD FRML MDRD: >60 ML/MIN/1.73M2
GLUCOSE SERPL-MCNC: 132 MG/DL (ref 74–106)
HBA1C MFR BLD: 6 % (ref 4–6)
HDLC SERPL-MCNC: 55 MG/DL
LDLC SERPL CALC-MCNC: 77 MG/DL
NONHDLC SERPL-MCNC: 91 MG/DL
POTASSIUM SERPL-SCNC: 4.9 MMOL/L (ref 3.5–5)
TRIGL SERPL-MCNC: 68 MG/DL

## 2020-08-06 ENCOUNTER — TELEPHONE (OUTPATIENT)
Dept: UROLOGY | Facility: OTHER | Age: 75
End: 2020-08-06

## 2020-08-06 DIAGNOSIS — R31.0 GROSS HEMATURIA: Primary | ICD-10-CM

## 2020-08-06 NOTE — TELEPHONE ENCOUNTER
[facility] and cystoscopy.  Please sign CT order and we will schedule pt for appt after.  NIKKI CAMILO LPN

## 2020-08-20 DIAGNOSIS — R31.0 GROSS HEMATURIA: Primary | ICD-10-CM

## 2020-08-24 ENCOUNTER — HOSPITAL ENCOUNTER (OUTPATIENT)
Dept: CT IMAGING | Facility: HOSPITAL | Age: 75
Discharge: HOME | End: 2020-08-24
Attending: UROLOGY | Admitting: UROLOGY
Payer: COMMERCIAL

## 2020-08-24 DIAGNOSIS — R31.0 GROSS HEMATURIA: ICD-10-CM

## 2020-08-24 PROCEDURE — 74178 CT ABD&PLV WO CNTR FLWD CNTR: CPT | Mod: TC

## 2020-08-24 PROCEDURE — 25500064 ZZH RX 255 OP 636: Performed by: RADIOLOGY

## 2020-08-24 RX ORDER — IOPAMIDOL 612 MG/ML
100 INJECTION, SOLUTION INTRAVASCULAR ONCE
Status: COMPLETED | OUTPATIENT
Start: 2020-08-24 | End: 2020-08-24

## 2020-08-24 RX ADMIN — IOPAMIDOL 100 ML: 612 INJECTION, SOLUTION INTRAVENOUS at 13:38

## 2020-08-25 ENCOUNTER — OFFICE VISIT (OUTPATIENT)
Dept: UROLOGY | Facility: OTHER | Age: 75
End: 2020-08-25
Attending: UROLOGY
Payer: COMMERCIAL

## 2020-08-25 VITALS
SYSTOLIC BLOOD PRESSURE: 120 MMHG | HEIGHT: 68 IN | WEIGHT: 220 LBS | HEART RATE: 106 BPM | BODY MASS INDEX: 33.34 KG/M2 | TEMPERATURE: 96.4 F | DIASTOLIC BLOOD PRESSURE: 70 MMHG | OXYGEN SATURATION: 98 %

## 2020-08-25 DIAGNOSIS — R31.0 GROSS HEMATURIA: Primary | ICD-10-CM

## 2020-08-25 LAB
ALBUMIN UR-MCNC: NEGATIVE MG/DL
APPEARANCE UR: CLEAR
BILIRUB UR QL STRIP: NEGATIVE
COLOR UR AUTO: YELLOW
GLUCOSE UR STRIP-MCNC: NEGATIVE MG/DL
HGB UR QL STRIP: NEGATIVE
KETONES UR STRIP-MCNC: 5 MG/DL
LEUKOCYTE ESTERASE UR QL STRIP: NEGATIVE
NITRATE UR QL: NEGATIVE
PH UR STRIP: 5.5 PH (ref 4.7–8)
SOURCE: ABNORMAL
SP GR UR STRIP: 1.03 (ref 1–1.03)
UROBILINOGEN UR STRIP-MCNC: NORMAL MG/DL (ref 0–2)

## 2020-08-25 PROCEDURE — 52000 CYSTOURETHROSCOPY: CPT | Performed by: UROLOGY

## 2020-08-25 PROCEDURE — 81003 URINALYSIS AUTO W/O SCOPE: CPT | Performed by: UROLOGY

## 2020-08-25 PROCEDURE — 88108 CYTOPATH CONCENTRATE TECH: CPT | Mod: TC | Performed by: UROLOGY

## 2020-08-25 ASSESSMENT — PAIN SCALES - GENERAL: PAINLEVEL: NO PAIN (0)

## 2020-08-25 ASSESSMENT — MIFFLIN-ST. JEOR: SCORE: 1702.41

## 2020-08-25 NOTE — PROGRESS NOTES
"  History     Chief Complaint:    Cystoscopy      HPI   Tavo Sanchez is a 75 year old male who returns today for further evaluation of his gross hematuria.  [facility] and recently he did get a PSA back in August and it was 3.3.  The hematuria that he noticed occurred after intercourse.  He has not had any further hematuria.  His INR runs in the mid twos to high twos typically.  He had voided prior to coming today but he has no symptoms and has not had a urinary tract infection ever.  He was able to leave enough urine for cytology.  He is here for his cystoscopy.  His CT scan was negative for any renal lesions although he did have a lesion in the lung that we will likely need following.    Allergies:    No Known Allergies     Medications:      ATENOLOL PO  DILTIAZEM HCL  LISINOPRIL PO  Simvastatin (ZOCOR PO)  vitamin C (ASCORBIC ACID) 1000 MG TABS  Warfarin Sodium (COUMADIN PO)        Problem List:      There are no active problems to display for this patient.       Past Medical History:      Past Medical History:   Diagnosis Date     Atrial fibrillation (H)      Unspecified cerebral artery occlusion with cerebral infarction 12/27/2012       Past Surgical History:      Past Surgical History:   Procedure Laterality Date     APPENDECTOMY OPEN N/A        Family History:      History reviewed. No pertinent family history.    Social History:    Marital Status:   [2]  Social History     Tobacco Use     Smoking status: Former Smoker     Smokeless tobacco: Former User   Substance Use Topics     Alcohol use: No     Drug use: None        Review of Systems   All other systems reviewed and are negative.        Physical Exam   Vitals:  /70 (BP Location: Right arm, Patient Position: Chair, Cuff Size: Adult Regular)   Pulse 106   Temp 96.4  F (35.8  C) (Tympanic)   Ht 1.727 m (5' 8\")   Wt 99.8 kg (220 lb)   SpO2 98%   BMI 33.45 kg/m        Physical Exam  Genitourinary:     " Comments: Penis is apparent on the foreskin meatus glans shaft of penis normal testes are both descended without any masses supra testicular masses or inguinal hernias.      Cystoscopy: Risks of bleeding and infection were discussed with the patient he is asymptomatic and had a negative urine culture a month ago.  Patient denies any symptoms of urinary tract infection has not had a urinary tract infection.  We elected to proceed.  Patient was prepped and draped sterilely lidocaine jelly was injected into the urethra.  The flexible cystoscope was inserted into the urethra to urethra is normal.  The posterior urethra shows inflammation in and around the prostate apex which just touching it with the scope bleeds.  Once inside the bladder both ureteral orifice ease are seen and the entire bladder is normal no diverticuli trabeculations or mucosal abnormalities.  When you retroflexed scope the bladder neck is normal.    EXAMINATION: CT ABDOMEN PELVIS W/O & W CONTRAST, 8/24/2020 1:51  PM     TECHNIQUE:  Helical CT images from the lung bases through the  symphysis pubis were obtained  with and without IV contrast IV  contrast. Contrast dose: ISOVUE 300 100 mL     COMPARISON: none     HISTORY: Hematuria, unknown cause; Gross hematuria     FINDINGS:     There are calcified granulomas in both lower lobes. Interstitial  thickening is noted bilaterally. There is a 12 mm noncalcified nodule  just above the level left hemidiaphragm and the left costophrenic  angle region. There is a subcutaneous nodule in the left lower chest  anterolaterally.     The liver is free of masses or biliary ductal enlargement. No  calcified gallstones are seen.     The the spleen and pancreas appear normal.     The adrenal glands are normal.     There are no renal masses. No renal calculi are seen. The calyces are  delicate and nondisplaced. The ureters show no evidence of obstruction  and follow normal course to the bladder. The bladder is free  of  intrinsic or extrinsic abnormality. The prostate is enlarged. The  periaortic lymph nodes are normal in caliber.     No intraperitoneal masses or inflammatory changes are noted.     In the pelvis the rectum appears normal.     Degenerative changes are present in the thoracic and lumbar spines                                                                      IMPRESSION: Several nodules are seen at the lung bases. Most are  calcified. A solitary 11 mm diameter noncalcified nodule is seen at  the left lung base.     No renal masses or hydronephrosis is seen. There are no renal or  ureteric calculi.     Enlarged prostate and no bladder masses are seen      GURU JANG MD      Impression:   Gross hematuria likely secondary to prostate bleeding          Plan     Plan: Just the scope touching this area caused it to bleed because he is on Coumadin so he will need to push his water today to prevent any concerns for significant bleeding or clot retention.  His PSA is in the normal range but I would recommend he get rechecked in about 6 months because he did have a slight abnormality on his prostate.  Tavo also had a nodule on the CT which I think needs further evaluation and we will contact his primary care to pursue that.    No follow-ups on file.    Patti Zamudio MD  [facility] [place]

## 2020-08-25 NOTE — LETTER
August 26, 2020      Guera Sanchez  [address] [place] [address] [address]  [facility] [place] [address]        Dear ,    We are writing to inform you of your test results.        Resulted Orders   Cytology non gyn   Result Value Ref Range    Copath Report       Patient Name: GUERA SANCHEZ  MR#: [id number]  Specimen #: [id number]  Collected: 8/25/2020  Received: 8/25/2020  Reported: 8/26/2020 13:15  Ordering Phy(s): DIAMOND HEIN  Additional Phy(s): APRIL LOYA CARLYLE  Copy To: april vance  [facility]  fax [phone]    For improved result formatting, select 'View Enhanced Report Format' under   Linked Documents section.    SPECIMEN/STAIN PROCESS:  Urine, voided.       Pap-Cyto x 1    ----------------------------------------------------------------    CYTOLOGIC INTERPRETATION:     Urine, voided.:   Negative for malignancy  Specimen Adequacy: Satisfactory for evaluation.    Electronically signed out by:    Rudy Kessler M.D.    CLINICAL HISTORY:  Gross hematuria.    ,    GROSS:    Urine, voided.:  10cc yellow fluid.  1 pap stained cytospin slide was processed.    CPT Codes:  A: 74980-IRE    COLLECTION SITE:  Client:  [facility]  Location:  [facility] ()    The technical component of this testing was completed at the [facility], with the  professional component performed at the [facility], [address] [place] [id number]  ([phone])       [facility]   Result Value Ref Range    Color Urine Yellow     Appearance Urine Clear     Glucose Urine Negative NEG^Negative mg/dL    Bilirubin Urine Negative NEG^Negative    Ketones Urine 5 (A) NEG^Negative mg/dL    Specific Gravity Urine 1.026 1.003 - 1.035    Blood Urine Negative NEG^Negative    pH Urine 5.5 4.7 - 8.0 pH    Protein Albumin Urine Negative NEG^Negative mg/dL    Urobilinogen mg/dL Normal 0.0 - 2.0 mg/dL    Nitrite Urine Negative NEG^Negative    Leukocyte Esterase Urine  Negative NEG^Negative    Source Midstream Urine      All looks good, hope all is well.   If you have any questions or concerns, please call the clinic at the number listed above.       Sincerely,        Patti Zamudio MD

## 2020-08-25 NOTE — LETTER
8/25/2020       RE: Tavo Sanchez  [address]  [address]  [facility] [address]     Dear Colleague,    Thank you for referring your patient, Tavo Sanchez, to the [facility]. Please see a copy of my visit note below.    Below you will find a summary of our visit.  Tavo did undergo a CT of his abdomen and pelvis which demonstrated some chest nodules which I feel will need further evaluation and I have instructed the patient to follow-up on that.  Tavo also has a slight abnormality to his prostate and I did recommend he have another PSA in 6 months.  I am happy to see him back at any time.      History     Chief Complaint:    Cystoscopy      HPI   Tvao Sanchez is a 75 year old male who returns today for further evaluation of his gross hematuria.  [facility] and recently he did get a PSA back in August and it was 3.3.  The hematuria that he noticed occurred after intercourse.  He has not had any further hematuria.  His INR runs in the mid twos to high twos typically.  He had voided prior to coming today but he has no symptoms and has not had a urinary tract infection ever.  He was able to leave enough urine for cytology.  He is here for his cystoscopy.  His CT scan was negative for any renal lesions although he did have a lesion in the lung that we will likely need following.    Allergies:    No Known Allergies     Medications:      ATENOLOL PO  DILTIAZEM HCL  LISINOPRIL PO  Simvastatin (ZOCOR PO)  vitamin C (ASCORBIC ACID) 1000 MG TABS  Warfarin Sodium (COUMADIN PO)        Problem List:      There are no active problems to display for this patient.       Past Medical History:      Past Medical History:   Diagnosis Date     Atrial fibrillation (H)      Unspecified cerebral artery occlusion with cerebral infarction 12/27/2012       Past Surgical History:      Past Surgical History:   Procedure Laterality Date      "APPENDECTOMY OPEN N/A        Family History:      History reviewed. No pertinent family history.    Social History:    Marital Status:   [2]  Social History     Tobacco Use     Smoking status: Former Smoker     Smokeless tobacco: Former User   Substance Use Topics     Alcohol use: No     Drug use: None        Review of Systems   All other systems reviewed and are negative.        Physical Exam   Vitals:  /70 (BP Location: Right arm, Patient Position: Chair, Cuff Size: Adult Regular)   Pulse 106   Temp 96.4  F (35.8  C) (Tympanic)   Ht 1.727 m (5' 8\")   Wt 99.8 kg (220 lb)   SpO2 98%   BMI 33.45 kg/m        Physical Exam  Genitourinary:     Comments: Penis is apparent on the foreskin meatus glans shaft of penis normal testes are both descended without any masses supra testicular masses or inguinal hernias.      Cystoscopy: Risks of bleeding and infection were discussed with the patient he is asymptomatic and had a negative urine culture a month ago.  Patient denies any symptoms of urinary tract infection has not had a urinary tract infection.  We elected to proceed.  Patient was prepped and draped sterilely lidocaine jelly was injected into the urethra.  The flexible cystoscope was inserted into the urethra to urethra is normal.  The posterior urethra shows inflammation in and around the prostate apex which just touching it with the scope bleeds.  Once inside the bladder both ureteral orifice ease are seen and the entire bladder is normal no diverticuli trabeculations or mucosal abnormalities.  When you retroflexed scope the bladder neck is normal.    EXAMINATION: CT ABDOMEN PELVIS W/O & W CONTRAST, 8/24/2020 1:51  PM     TECHNIQUE:  Helical CT images from the lung bases through the  symphysis pubis were obtained  with and without IV contrast IV  contrast. Contrast dose: ISOVUE 300 100 mL     COMPARISON: none     HISTORY: Hematuria, unknown cause; Gross hematuria     FINDINGS:     There are " calcified granulomas in both lower lobes. Interstitial  thickening is noted bilaterally. There is a 12 mm noncalcified nodule  just above the level left hemidiaphragm and the left costophrenic  angle region. There is a subcutaneous nodule in the left lower chest  anterolaterally.     The liver is free of masses or biliary ductal enlargement. No  calcified gallstones are seen.     The the spleen and pancreas appear normal.     The adrenal glands are normal.     There are no renal masses. No renal calculi are seen. The calyces are  delicate and nondisplaced. The ureters show no evidence of obstruction  and follow normal course to the bladder. The bladder is free of  intrinsic or extrinsic abnormality. The prostate is enlarged. The  periaortic lymph nodes are normal in caliber.     No intraperitoneal masses or inflammatory changes are noted.     In the pelvis the rectum appears normal.     Degenerative changes are present in the thoracic and lumbar spines                                                                      IMPRESSION: Several nodules are seen at the lung bases. Most are  calcified. A solitary 11 mm diameter noncalcified nodule is seen at  the left lung base.     No renal masses or hydronephrosis is seen. There are no renal or  ureteric calculi.     Enlarged prostate and no bladder masses are seen      GURU JANG MD      Impression:   Gross hematuria likely secondary to prostate bleeding          Plan     Plan: Just the scope touching this area caused it to bleed because he is on Coumadin so he will need to push his water today to prevent any concerns for significant bleeding or clot retention.  His PSA is in the normal range but I would recommend he get rechecked in about 6 months because he did have a slight abnormality on his prostate.  Tavo also had a nodule on the CT which I think needs further evaluation and we will contact his primary care to pursue that.    No follow-ups on  file.    Patti Zamudio MD  [facility] [place]            Again, thank you for allowing me to participate in the care of your patient.      Sincerely,    Patti Zamudio MD

## 2020-08-25 NOTE — NURSING NOTE
"Chief Complaint   Patient presents with     Cystoscopy       Initial /70 (BP Location: Right arm, Patient Position: Chair, Cuff Size: Adult Regular)   Pulse 106   Temp 96.4  F (35.8  C) (Tympanic)   Ht 1.727 m (5' 8\")   Wt 99.8 kg (220 lb)   SpO2 98%   BMI 33.45 kg/m   Estimated body mass index is 33.45 kg/m  as calculated from the following:    Height as of this encounter: 1.727 m (5' 8\").    Weight as of this encounter: 99.8 kg (220 lb).  Medication Reconciliation: complete  NIKKI CAMILO LPN    Review of Systems:    Weight loss:    No     Recent fever/chills:  No   Night sweats:   No  Current skin rash:  No   Recent hair loss:  No  Heat intolerance:  No   Cold intolerance:  No  Chest pain:   No   Palpitations:   No  Shortness of breath:  No   Wheezing:   No  Constipation:    No   Diarrhea:   No   Nausea:   No   Vomiting:   No   Kidney/side pain:  No   Back pain:   No  Frequent headaches:  No   Dizziness:     No  Leg swelling:   yes   Calf pain:    No      NIKKI CAMILO LPN    "

## 2020-08-25 NOTE — LETTER
August 26, 2020      Guera Sanchez  [address] [place] [address]  [facility] [address]        Dear ,    We are writing to inform you of your test results.    {results letter list:[id number]}    Resulted Orders   Cytology non gyn   Result Value Ref Range    Copath Report       Patient Name: GUERA SANCHEZ  MR#: [id number]  Specimen #: [id number]  Collected: 8/25/2020  Received: 8/25/2020  Reported: 8/26/2020 13:15  Ordering Phy(s): DIAMOND HEIN  Additional Phy(s): APRIL SHALINI CARLYLE  Copy To: april vance  [facility]  fax [phone]    For improved result formatting, select 'View Enhanced Report Format' under   Linked Documents section.    SPECIMEN/STAIN PROCESS:  Urine, voided.       Pap-Cyto x 1    ----------------------------------------------------------------    CYTOLOGIC INTERPRETATION:     Urine, voided.:   Negative for malignancy  Specimen Adequacy: Satisfactory for evaluation.    Electronically signed out by:    Rudy Kessler M.D.    CLINICAL HISTORY:  Gross hematuria.    ,    GROSS:    Urine, voided.:  10cc yellow fluid.  1 pap stained cytospin slide was processed.    CPT Codes:  A: 18129-DIR    COLLECTION SITE:  Client:  [facility]  Location:  [facility] ()    The technical component of this testing was completed at the [facility], with the  professional component performed at the [facility], [address] [place] [id number]  ([phone])       [facility]   Result Value Ref Range    Color Urine Yellow     Appearance Urine Clear     Glucose Urine Negative NEG^Negative mg/dL    Bilirubin Urine Negative NEG^Negative    Ketones Urine 5 (A) NEG^Negative mg/dL    Specific Gravity Urine 1.026 1.003 - 1.035    Blood Urine Negative NEG^Negative    pH Urine 5.5 4.7 - 8.0 pH    Protein Albumin Urine Negative NEG^Negative mg/dL    Urobilinogen mg/dL Normal 0.0 - 2.0 mg/dL    Nitrite Urine Negative NEG^Negative     Leukocyte Esterase Urine Negative NEG^Negative    Source Midstream Urine        If you have any questions or concerns, please call the clinic at the number listed above.       Sincerely,        Patti Zamudio MD

## 2020-08-26 LAB — COPATH REPORT: NORMAL

## 2021-03-03 ENCOUNTER — MEDICAL CORRESPONDENCE (OUTPATIENT)
Dept: CT IMAGING | Facility: HOSPITAL | Age: 76
End: 2021-03-03

## 2021-03-04 ENCOUNTER — HOSPITAL ENCOUNTER (OUTPATIENT)
Dept: CT IMAGING | Facility: HOSPITAL | Age: 76
Discharge: HOME | End: 2021-03-04
Attending: FAMILY MEDICINE | Admitting: FAMILY MEDICINE
Payer: COMMERCIAL

## 2021-03-04 DIAGNOSIS — R91.1 SOLITARY PULMONARY NODULE: ICD-10-CM

## 2021-03-04 PROCEDURE — 71250 CT THORAX DX C-: CPT

## 2022-03-15 ENCOUNTER — MEDICAL CORRESPONDENCE (OUTPATIENT)
Dept: CT IMAGING | Facility: HOSPITAL | Age: 77
End: 2022-03-15
Payer: COMMERCIAL

## 2022-03-17 ENCOUNTER — HOSPITAL ENCOUNTER (OUTPATIENT)
Dept: CT IMAGING | Facility: HOSPITAL | Age: 77
Discharge: HOME | End: 2022-03-17
Attending: FAMILY MEDICINE | Admitting: FAMILY MEDICINE
Payer: COMMERCIAL

## 2022-03-17 DIAGNOSIS — R91.1 SOLITARY PULMONARY NODULE: ICD-10-CM

## 2022-03-17 PROCEDURE — 71250 CT THORAX DX C-: CPT

## 2022-06-09 ENCOUNTER — HOSPITAL ENCOUNTER (EMERGENCY)
Facility: HOSPITAL | Age: 77
Discharge: HOME | End: 2022-06-09
Attending: NURSE PRACTITIONER | Admitting: NURSE PRACTITIONER
Payer: COMMERCIAL

## 2022-06-09 VITALS
OXYGEN SATURATION: 96 % | RESPIRATION RATE: 20 BRPM | TEMPERATURE: 98 F | HEART RATE: 107 BPM | DIASTOLIC BLOOD PRESSURE: 76 MMHG | SYSTOLIC BLOOD PRESSURE: 159 MMHG

## 2022-06-09 DIAGNOSIS — Z20.822 ENCOUNTER FOR LABORATORY TESTING FOR COVID-19 VIRUS: ICD-10-CM

## 2022-06-09 PROCEDURE — 99213 OFFICE O/P EST LOW 20 MIN: CPT | Performed by: NURSE PRACTITIONER

## 2022-06-09 PROCEDURE — C9803 HOPD COVID-19 SPEC COLLECT: HCPCS

## 2022-06-09 PROCEDURE — 87637 SARSCOV2&INF A&B&RSV AMP PRB: CPT | Performed by: NURSE PRACTITIONER

## 2022-06-09 PROCEDURE — G0463 HOSPITAL OUTPT CLINIC VISIT: HCPCS

## 2022-06-09 ASSESSMENT — ENCOUNTER SYMPTOMS
NEUROLOGICAL NEGATIVE: 1
FATIGUE: 1
FEVER: 0
EYES NEGATIVE: 1
MUSCULOSKELETAL NEGATIVE: 1
ENDOCRINE NEGATIVE: 1
CHILLS: 0
GASTROINTESTINAL NEGATIVE: 1
RESPIRATORY NEGATIVE: 1
ALLERGIC/IMMUNOLOGIC NEGATIVE: 1
HEMATOLOGIC/LYMPHATIC NEGATIVE: 1
PSYCHIATRIC NEGATIVE: 1
CARDIOVASCULAR NEGATIVE: 1

## 2022-06-09 NOTE — ED PROVIDER NOTES
History     Chief Complaint   Patient presents with     Covid 19 Testing     HPI  Tavo Sanchez is a 76 year old male with a medical history of atrial fibrillation and unspecified cerebral artery occlusion.  He presents today for evaluation of COVID testing as his wife tested positive yesterday for COVID and she is requesting that he be tested today.  He denies any symptoms other than mild fatigue.  He has been doing his daily activities as normal.  He denies any chest pain, cough, or shortness of breath.     Allergies:  No Known Allergies    Problem List:    There are no problems to display for this patient.       Past Medical History:    Past Medical History:   Diagnosis Date     Atrial fibrillation (H)      Unspecified cerebral artery occlusion with cerebral infarction 12/27/2012       Past Surgical History:    Past Surgical History:   Procedure Laterality Date     APPENDECTOMY OPEN N/A        Family History:    History reviewed. No pertinent family history.    Social History:  Marital Status:   [2]  Social History     Tobacco Use     Smoking status: Former Smoker     Smokeless tobacco: Former User   Substance Use Topics     Alcohol use: No        Medications:    ATENOLOL PO  LISINOPRIL PO  Simvastatin (ZOCOR PO)  vitamin C (ASCORBIC ACID) 1000 MG TABS  Warfarin Sodium (COUMADIN PO)  DILTIAZEM HCL          Review of Systems   Constitutional: Positive for fatigue. Negative for chills and fever.   HENT: Negative.    Eyes: Negative.    Respiratory: Negative.    Cardiovascular: Negative.    Gastrointestinal: Negative.    Endocrine: Negative.    Genitourinary: Negative.    Musculoskeletal: Negative.    Skin: Negative.    Allergic/Immunologic: Negative.    Neurological: Negative.    Hematological: Negative.    Psychiatric/Behavioral: Negative.        Physical Exam   BP: 159/76  Pulse: 107  Temp: 98  F (36.7  C)  Resp: 20  SpO2: 96 %      Physical Exam  Vitals and nursing note reviewed.   Constitutional:        Appearance: Normal appearance. He is normal weight.   HENT:      Head: Normocephalic and atraumatic.      Nose: Nose normal.      Mouth/Throat:      Mouth: Mucous membranes are moist.      Pharynx: Oropharynx is clear.   Eyes:      Extraocular Movements: Extraocular movements intact.      Conjunctiva/sclera: Conjunctivae normal.      Pupils: Pupils are equal, round, and reactive to light.   Cardiovascular:      Rate and Rhythm: Rhythm irregular.      Pulses: Normal pulses.      Heart sounds: Normal heart sounds.      Comments: Patient has chronic atrial fibrillation  Pulmonary:      Effort: Pulmonary effort is normal.      Breath sounds: Normal breath sounds.   Abdominal:      General: Abdomen is flat. Bowel sounds are normal.      Palpations: Abdomen is soft.   Musculoskeletal:         General: Normal range of motion.      Cervical back: Normal range of motion.   Skin:     General: Skin is warm and dry.   Neurological:      General: No focal deficit present.      Mental Status: He is alert and oriented to person, place, and time.   Psychiatric:         Mood and Affect: Mood normal.         Behavior: Behavior normal.         Thought Content: Thought content normal.         Judgment: Judgment normal.         ED Course                Assessments & Plan (with Medical Decision Making)   76-year-old male presents for COVID testing after his wife tested positive yesterday for COVID.  He has mild fatigue otherwise has no symptoms.  He is vitally stable.  Does not appear ill or in any distress.  Assessment is reassuring.    Plan: COVID test    Discharge plan discussed with patient as he has only fatigue as his symptoms, he will rest, increase fluids, quarantine with his wife until the stated amount of time per CDC guidelines.  Patient instructed to return if he develops chest pain, shortness of breath, moist cough, or any concerning symptoms.  Patient will be contacted with results of COVID.      I have reviewed the nursing  notes.    I have reviewed the findings, diagnosis, plan and need for follow up with the patient.      Discharge Medication List as of 6/9/2022  4:45 PM          Final diagnoses:   Encounter for laboratory testing for COVID-19 virus       6/9/2022   [facility]     Yana Mcbride APRN CNP  06/15/22 [id number]

## 2022-06-09 NOTE — DISCHARGE INSTRUCTIONS
Thank you for choosing [facility] for your healthcare needs today.  For your concern of COVID, your COVID test results should be available within 2 to 3 days.  Please watch your MyChart for test results or somebody may contact you with a positive COVID results.  If you develop symptoms, or you develop cough, chest pain, or shortness of breath please return to the emergency room.  If you develop mild symptoms, use over-the-counter medications for relief of symptoms.  If you develop any concerning symptoms please feel free to return to urgent care or call.  Thank you

## 2022-06-09 NOTE — ED TRIAGE NOTES
Patient presents to urgent care for COVID testing due to wife testing positive last night. Patient states he does have fatigue and a little congestion.

## 2022-06-09 NOTE — ED TRIAGE NOTES
Patient presents wanting to get tested for COVID as his wife tested positive for COVID yesterday.

## 2022-06-10 ENCOUNTER — TELEPHONE (OUTPATIENT)
Dept: FAMILY MEDICINE | Facility: OTHER | Age: 77
End: 2022-06-10
Payer: COMMERCIAL

## 2022-06-10 LAB
FLUAV RNA SPEC QL NAA+PROBE: NEGATIVE
FLUBV RNA RESP QL NAA+PROBE: NEGATIVE
RSV RNA SPEC NAA+PROBE: NEGATIVE
SARS-COV-2 RNA RESP QL NAA+PROBE: NEGATIVE

## 2022-06-10 NOTE — TELEPHONE ENCOUNTER
Patient calling on covid 19 results from 6/10/22.    Notified of negative Covid, FLU and RSV. Patient verbalized understanding.

## 2025-01-02 ENCOUNTER — HOSPITAL ENCOUNTER (EMERGENCY)
Facility: HOSPITAL | Age: 80
Discharge: HOME | End: 2025-01-02
Attending: NURSE PRACTITIONER | Admitting: NURSE PRACTITIONER
Payer: COMMERCIAL

## 2025-01-02 VITALS
OXYGEN SATURATION: 96 % | RESPIRATION RATE: 16 BRPM | HEART RATE: 96 BPM | TEMPERATURE: 97.8 F | DIASTOLIC BLOOD PRESSURE: 92 MMHG | SYSTOLIC BLOOD PRESSURE: 140 MMHG

## 2025-01-02 DIAGNOSIS — K08.89 PAIN, DENTAL: Primary | ICD-10-CM

## 2025-01-02 PROCEDURE — G0463 HOSPITAL OUTPT CLINIC VISIT: HCPCS

## 2025-01-02 PROCEDURE — 99213 OFFICE O/P EST LOW 20 MIN: CPT | Performed by: NURSE PRACTITIONER

## 2025-01-02 ASSESSMENT — ENCOUNTER SYMPTOMS
NAUSEA: 0
HEADACHES: 0
VOMITING: 0
FEVER: 0
NECK PAIN: 0
PSYCHIATRIC NEGATIVE: 1
TROUBLE SWALLOWING: 0
FACIAL SWELLING: 0
CHILLS: 0
ABDOMINAL PAIN: 0
SHORTNESS OF BREATH: 0
DIARRHEA: 0
NECK STIFFNESS: 0

## 2025-01-02 ASSESSMENT — ACTIVITIES OF DAILY LIVING (ADL): ADLS_ACUITY_SCORE: 41

## 2025-01-02 NOTE — ED TRIAGE NOTES
Pt presents with c/o having increased right dental pain pt states has been pretty bad the last 2 days   Pt states does not go to the dentist and has a chipped tooth that thinks is infected   No otc meds taken today

## 2025-01-02 NOTE — DISCHARGE INSTRUCTIONS
Augmentin as ordered  - Take entire course of antibiotic even if you start to feel better.  - Antibiotics can cause stomach upset including nausea and diarrhea. Read your bottle or ask the pharmacist if antibiotic can be taken with food to help prevent nausea. If you have symptoms of diarrhea you can take an over-the-counter probiotic and/or increase foods with probiotics such as yogurt, [place], sauerkraut.    Follow-up with a dentist for further evaluation    Follow-up with primary care provider or return to urgent care/ED with any worsening in condition or additional concerns.

## 2025-01-02 NOTE — ED PROVIDER NOTES
History     Chief Complaint   Patient presents with    Dental Pain     HPI  Tavo Sanchez is a 79 year old male who presents to urgent care today ambulatory with complaints of dental pain to tooth #3 that started 2 days ago.  Patient states that tooth cracked and broke over 15 years ago.  No drainage.  No difficulty swallowing.  No trismus.  No facial swelling.  No neck pain or stiffness.  Denies any fever, chills, nausea, vomiting, diarrhea, shortness of breath or chest pain.  Does not have established dentist, spouse does so patient is going to try to see the dentist.  Has been using Orajel as needed.  No other concerns.    Allergies:  No Known Allergies    Problem List:    There are no active problems to display for this patient.       Past Medical History:    Past Medical History:   Diagnosis Date    Atrial fibrillation (H)     Unspecified cerebral artery occlusion with cerebral infarction 12/27/2012       Past Surgical History:    Past Surgical History:   Procedure Laterality Date    APPENDECTOMY OPEN N/A        Family History:    No family history on file.    Social History:  Marital Status:   [2]  Social History     Tobacco Use    Smoking status: Former    Smokeless tobacco: Former   Substance Use Topics    Alcohol use: No        Medications:    amoxicillin-clavulanate (AUGMENTIN) 875-125 MG tablet  ATENOLOL PO  DILTIAZEM HCL  LISINOPRIL PO  Simvastatin (ZOCOR PO)  vitamin C (ASCORBIC ACID) 1000 MG TABS  Warfarin Sodium (COUMADIN PO)      Review of Systems   Constitutional:  Negative for chills and fever.   HENT:  Positive for dental problem. Negative for facial swelling and trouble swallowing.    Respiratory:  Negative for shortness of breath.    Cardiovascular:  Negative for chest pain.   Gastrointestinal:  Negative for abdominal pain, diarrhea, nausea and vomiting.   Musculoskeletal:  Negative for gait problem, neck pain and neck stiffness.   Neurological:  Negative for headaches.    Psychiatric/Behavioral: Negative.       Physical Exam   BP: 140/92  Pulse: 96  Temp: 97.8  F (36.6  C)  Resp: 16  SpO2: 96 %    Physical Exam  Vitals and nursing note reviewed.   Constitutional:       General: He is not in acute distress.     Appearance: Normal appearance. He is not ill-appearing or toxic-appearing.   HENT:      Head:      Jaw: There is normal jaw occlusion.      Right Ear: Tympanic membrane, ear canal and external ear normal.      Left Ear: Tympanic membrane, ear canal and external ear normal.      Nose: Nose normal.      Mouth/Throat:      Mouth: Mucous membranes are moist.      Dentition: Abnormal dentition. No gingival swelling or dental abscesses.      Pharynx: Oropharynx is clear.      Comments: Overall dentition poor.  Dental pain to tooth #3.  Tooth cracked.  No gingival swelling.  No obvious abscess.  No trismus.  No facial swelling.  No neck pain or stiffness.  No difficulty swallowing.  Cardiovascular:      Rate and Rhythm: Normal rate and regular rhythm.      Pulses: Normal pulses.      Heart sounds: Normal heart sounds.   Pulmonary:      Effort: Pulmonary effort is normal.      Breath sounds: Normal breath sounds.   Musculoskeletal:      Cervical back: Normal range of motion and neck supple. No rigidity or tenderness.   Lymphadenopathy:      Cervical: No cervical adenopathy.   Skin:     General: Skin is warm and dry.      Capillary Refill: Capillary refill takes less than 2 seconds.   Neurological:      Mental Status: He is alert.   Psychiatric:         Mood and Affect: Mood normal.       ED Course     Procedures    No results found for this or any previous visit (from the past 24 hours).    Medications - No data to display    Assessments & Plan (with Medical Decision Making)     I have reviewed the nursing notes.    I have reviewed the findings, diagnosis, plan and need for follow up with the patient.  (K08.89) Pain, dental  (primary encounter diagnosis)  Plan:   Patient ambulatory  with a nontoxic appearance.  Overall dentition poor.  Dental pain to tooth #3.  Tooth cracked.  No gingival swelling.  No obvious abscess.  No trismus.  No facial swelling.  No neck pain or stiffness.  No difficulty swallowing.  Will start patient on Augmentin.  Patient to follow up with a dentist for further evaluation.  Follow-up with primary care provider or return to urgent care/ED with any worsening in condition or additional concerns.  Patient in agreement treatment plan.    New Prescriptions    AMOXICILLIN-CLAVULANATE (AUGMENTIN) 875-125 MG TABLET    Take 1 tablet by mouth 2 times daily for 7 days.     Final diagnoses:   Pain, dental     1/2/2025   [facility]       Yessenia Hyatt NP  01/02/25 1327

## 2025-01-08 ENCOUNTER — APPOINTMENT (OUTPATIENT)
Dept: GENERAL RADIOLOGY | Facility: HOSPITAL | Age: 80
End: 2025-01-08
Attending: INTERNAL MEDICINE
Payer: COMMERCIAL

## 2025-01-08 ENCOUNTER — HOSPITAL ENCOUNTER (EMERGENCY)
Facility: HOSPITAL | Age: 80
Discharge: HOME | End: 2025-01-08
Attending: INTERNAL MEDICINE
Payer: COMMERCIAL

## 2025-01-08 VITALS
WEIGHT: 220 LBS | TEMPERATURE: 98.3 F | SYSTOLIC BLOOD PRESSURE: 146 MMHG | DIASTOLIC BLOOD PRESSURE: 85 MMHG | HEART RATE: 107 BPM | RESPIRATION RATE: 17 BRPM | BODY MASS INDEX: 33.45 KG/M2 | OXYGEN SATURATION: 97 %

## 2025-01-08 DIAGNOSIS — I48.91 ATRIAL FIBRILLATION WITH RAPID VENTRICULAR RESPONSE (H): ICD-10-CM

## 2025-01-08 DIAGNOSIS — J40 BRONCHITIS: ICD-10-CM

## 2025-01-08 DIAGNOSIS — J18.9 PNEUMONIA OF RIGHT MIDDLE LOBE DUE TO INFECTIOUS ORGANISM: ICD-10-CM

## 2025-01-08 LAB
ALBUMIN SERPL BCG-MCNC: 4.2 G/DL (ref 3.5–5.2)
ALP SERPL-CCNC: 113 U/L (ref 40–150)
ALT SERPL W P-5'-P-CCNC: 29 U/L (ref 0–70)
ANION GAP SERPL CALCULATED.3IONS-SCNC: 15 MMOL/L (ref 7–15)
AST SERPL W P-5'-P-CCNC: 34 U/L (ref 0–45)
ATRIAL RATE - MUSE: NORMAL BPM
BASOPHILS # BLD AUTO: 0 10E3/UL (ref 0–0.2)
BASOPHILS NFR BLD AUTO: 1 %
BILIRUB SERPL-MCNC: 0.5 MG/DL
BUN SERPL-MCNC: 12.5 MG/DL (ref 8–23)
CALCIUM SERPL-MCNC: 9.1 MG/DL (ref 8.8–10.4)
CHLORIDE SERPL-SCNC: 101 MMOL/L (ref 98–107)
CREAT SERPL-MCNC: 0.87 MG/DL (ref 0.67–1.17)
CRP SERPL-MCNC: 7.01 MG/L
DIASTOLIC BLOOD PRESSURE - MUSE: NORMAL MMHG
EGFRCR SERPLBLD CKD-EPI 2021: 87 ML/MIN/1.73M2
EOSINOPHIL # BLD AUTO: 0.1 10E3/UL (ref 0–0.7)
EOSINOPHIL NFR BLD AUTO: 2 %
ERYTHROCYTE [DISTWIDTH] IN BLOOD BY AUTOMATED COUNT: 13.1 % (ref 10–15)
FLUAV RNA SPEC QL NAA+PROBE: NEGATIVE
FLUBV RNA RESP QL NAA+PROBE: NEGATIVE
GLUCOSE SERPL-MCNC: 148 MG/DL (ref 70–99)
HCO3 SERPL-SCNC: 20 MMOL/L (ref 22–29)
HCT VFR BLD AUTO: 42.1 % (ref 40–53)
HGB BLD-MCNC: 14.1 G/DL (ref 13.3–17.7)
HOLD SPECIMEN: NORMAL
IMM GRANULOCYTES # BLD: 0 10E3/UL
IMM GRANULOCYTES NFR BLD: 0 %
INTERPRETATION ECG - MUSE: NORMAL
LACTATE SERPL-SCNC: 1.1 MMOL/L (ref 0.7–2)
LYMPHOCYTES # BLD AUTO: 1.2 10E3/UL (ref 0.8–5.3)
LYMPHOCYTES NFR BLD AUTO: 15 %
MCH RBC QN AUTO: 30.3 PG (ref 26.5–33)
MCHC RBC AUTO-ENTMCNC: 33.5 G/DL (ref 31.5–36.5)
MCV RBC AUTO: 90 FL (ref 78–100)
MONOCYTES # BLD AUTO: 0.5 10E3/UL (ref 0–1.3)
MONOCYTES NFR BLD AUTO: 6 %
NEUTROPHILS # BLD AUTO: 6.3 10E3/UL (ref 1.6–8.3)
NEUTROPHILS NFR BLD AUTO: 77 %
NRBC # BLD AUTO: 0 10E3/UL
NRBC BLD AUTO-RTO: 0 /100
NT-PROBNP SERPL-MCNC: 1392 PG/ML (ref 0–1800)
P AXIS - MUSE: NORMAL DEGREES
PLATELET # BLD AUTO: 233 10E3/UL (ref 150–450)
POTASSIUM SERPL-SCNC: 4.6 MMOL/L (ref 3.4–5.3)
PR INTERVAL - MUSE: NORMAL MS
PROCALCITONIN SERPL IA-MCNC: 0.04 NG/ML
PROT SERPL-MCNC: 7.5 G/DL (ref 6.4–8.3)
QRS DURATION - MUSE: 82 MS
QT - MUSE: 264 MS
QTC - MUSE: 374 MS
R AXIS - MUSE: 30 DEGREES
RBC # BLD AUTO: 4.66 10E6/UL (ref 4.4–5.9)
RSV RNA SPEC NAA+PROBE: NEGATIVE
SARS-COV-2 RNA RESP QL NAA+PROBE: NEGATIVE
SODIUM SERPL-SCNC: 136 MMOL/L (ref 135–145)
SYSTOLIC BLOOD PRESSURE - MUSE: NORMAL MMHG
T AXIS - MUSE: 7 DEGREES
TROPONIN T SERPL HS-MCNC: <6 NG/L
VENTRICULAR RATE- MUSE: 121 BPM
WBC # BLD AUTO: 8.1 10E3/UL (ref 4–11)

## 2025-01-08 PROCEDURE — 84145 PROCALCITONIN (PCT): CPT | Performed by: INTERNAL MEDICINE

## 2025-01-08 PROCEDURE — 86140 C-REACTIVE PROTEIN: CPT | Performed by: INTERNAL MEDICINE

## 2025-01-08 PROCEDURE — 93010 ELECTROCARDIOGRAM REPORT: CPT | Performed by: INTERNAL MEDICINE

## 2025-01-08 PROCEDURE — 99285 EMERGENCY DEPT VISIT HI MDM: CPT | Mod: 25

## 2025-01-08 PROCEDURE — 250N000013 HC RX MED GY IP 250 OP 250 PS 637: Performed by: INTERNAL MEDICINE

## 2025-01-08 PROCEDURE — 71045 X-RAY EXAM CHEST 1 VIEW: CPT

## 2025-01-08 PROCEDURE — 99284 EMERGENCY DEPT VISIT MOD MDM: CPT | Performed by: INTERNAL MEDICINE

## 2025-01-08 PROCEDURE — 85014 HEMATOCRIT: CPT | Performed by: INTERNAL MEDICINE

## 2025-01-08 PROCEDURE — 36415 COLL VENOUS BLD VENIPUNCTURE: CPT | Performed by: INTERNAL MEDICINE

## 2025-01-08 PROCEDURE — 85004 AUTOMATED DIFF WBC COUNT: CPT | Performed by: INTERNAL MEDICINE

## 2025-01-08 PROCEDURE — 96375 TX/PRO/DX INJ NEW DRUG ADDON: CPT

## 2025-01-08 PROCEDURE — 258N000003 HC RX IP 258 OP 636: Performed by: INTERNAL MEDICINE

## 2025-01-08 PROCEDURE — 999N000157 HC STATISTIC RCP TIME EA 10 MIN

## 2025-01-08 PROCEDURE — 96365 THER/PROPH/DIAG IV INF INIT: CPT

## 2025-01-08 PROCEDURE — 96376 TX/PRO/DX INJ SAME DRUG ADON: CPT

## 2025-01-08 PROCEDURE — 93005 ELECTROCARDIOGRAM TRACING: CPT

## 2025-01-08 PROCEDURE — 87637 SARSCOV2&INF A&B&RSV AMP PRB: CPT | Performed by: INTERNAL MEDICINE

## 2025-01-08 PROCEDURE — 84484 ASSAY OF TROPONIN QUANT: CPT | Performed by: INTERNAL MEDICINE

## 2025-01-08 PROCEDURE — 83605 ASSAY OF LACTIC ACID: CPT | Performed by: INTERNAL MEDICINE

## 2025-01-08 PROCEDURE — 96367 TX/PROPH/DG ADDL SEQ IV INF: CPT

## 2025-01-08 PROCEDURE — 83880 ASSAY OF NATRIURETIC PEPTIDE: CPT | Performed by: INTERNAL MEDICINE

## 2025-01-08 PROCEDURE — 94640 AIRWAY INHALATION TREATMENT: CPT

## 2025-01-08 PROCEDURE — 87040 BLOOD CULTURE FOR BACTERIA: CPT | Performed by: INTERNAL MEDICINE

## 2025-01-08 PROCEDURE — 82040 ASSAY OF SERUM ALBUMIN: CPT | Performed by: INTERNAL MEDICINE

## 2025-01-08 PROCEDURE — 250N000009 HC RX 250: Performed by: INTERNAL MEDICINE

## 2025-01-08 PROCEDURE — 250N000011 HC RX IP 250 OP 636: Mod: JZ | Performed by: INTERNAL MEDICINE

## 2025-01-08 RX ORDER — DILTIAZEM HYDROCHLORIDE 180 MG/1
CAPSULE, COATED, EXTENDED RELEASE ORAL
Status: COMPLETED
Start: 2025-01-08 | End: 2025-01-08

## 2025-01-08 RX ORDER — IPRATROPIUM BROMIDE AND ALBUTEROL SULFATE 2.5; .5 MG/3ML; MG/3ML
3 SOLUTION RESPIRATORY (INHALATION) ONCE
Status: COMPLETED | OUTPATIENT
Start: 2025-01-08 | End: 2025-01-08

## 2025-01-08 RX ORDER — AZITHROMYCIN 250 MG/1
TABLET, FILM COATED ORAL
Qty: 6 TABLET | Refills: 0 | Status: SHIPPED | OUTPATIENT
Start: 2025-01-09 | End: 2025-01-13

## 2025-01-08 RX ORDER — METHYLPREDNISOLONE SODIUM SUCCINATE 125 MG/2ML
125 INJECTION INTRAMUSCULAR; INTRAVENOUS ONCE
Status: COMPLETED | OUTPATIENT
Start: 2025-01-08 | End: 2025-01-08

## 2025-01-08 RX ORDER — PREDNISONE 20 MG/1
TABLET ORAL
Qty: 5 TABLET | Refills: 0 | Status: SHIPPED | OUTPATIENT
Start: 2025-01-08 | End: 2025-01-15

## 2025-01-08 RX ORDER — DILTIAZEM HYDROCHLORIDE 5 MG/ML
20 INJECTION INTRAVENOUS ONCE
Status: COMPLETED | OUTPATIENT
Start: 2025-01-08 | End: 2025-01-08

## 2025-01-08 RX ORDER — CEFTRIAXONE SODIUM 2 G/50ML
2 INJECTION, SOLUTION INTRAVENOUS ONCE
Status: COMPLETED | OUTPATIENT
Start: 2025-01-08 | End: 2025-01-08

## 2025-01-08 RX ORDER — CARVEDILOL 25 MG/1
25 TABLET ORAL ONCE
Status: COMPLETED | OUTPATIENT
Start: 2025-01-08 | End: 2025-01-08

## 2025-01-08 RX ORDER — DILTIAZEM HYDROCHLORIDE 5 MG/ML
15 INJECTION INTRAVENOUS ONCE
Status: COMPLETED | OUTPATIENT
Start: 2025-01-08 | End: 2025-01-08

## 2025-01-08 RX ORDER — ALBUTEROL SULFATE 90 UG/1
2 INHALANT RESPIRATORY (INHALATION) EVERY 6 HOURS PRN
Qty: 18 G | Refills: 0 | Status: SHIPPED | OUTPATIENT
Start: 2025-01-08

## 2025-01-08 RX ORDER — DILTIAZEM HYDROCHLORIDE 120 MG/1
CAPSULE, COATED, EXTENDED RELEASE ORAL
Status: COMPLETED
Start: 2025-01-08 | End: 2025-01-08

## 2025-01-08 RX ORDER — AZITHROMYCIN 500 MG/5ML
500 INJECTION, POWDER, LYOPHILIZED, FOR SOLUTION INTRAVENOUS ONCE
Status: COMPLETED | OUTPATIENT
Start: 2025-01-08 | End: 2025-01-08

## 2025-01-08 RX ADMIN — CEFTRIAXONE SODIUM 2 G: 2 INJECTION, SOLUTION INTRAVENOUS at 04:16

## 2025-01-08 RX ADMIN — AZITHROMYCIN MONOHYDRATE 500 MG: 500 INJECTION, POWDER, LYOPHILIZED, FOR SOLUTION INTRAVENOUS at 04:44

## 2025-01-08 RX ADMIN — CARVEDILOL 25 MG: 25 TABLET, FILM COATED ORAL at 04:50

## 2025-01-08 RX ADMIN — METHYLPREDNISOLONE SODIUM SUCCINATE 125 MG: 125 INJECTION, POWDER, FOR SOLUTION INTRAMUSCULAR; INTRAVENOUS at 03:51

## 2025-01-08 RX ADMIN — DILTIAZEM HYDROCHLORIDE 15 MG: 5 INJECTION, SOLUTION INTRAVENOUS at 03:10

## 2025-01-08 RX ADMIN — DILTIAZEM HYDROCHLORIDE 20 MG: 5 INJECTION, SOLUTION INTRAVENOUS at 03:53

## 2025-01-08 RX ADMIN — IPRATROPIUM BROMIDE AND ALBUTEROL SULFATE 3 ML: .5; 3 SOLUTION RESPIRATORY (INHALATION) at 03:55

## 2025-01-08 RX ADMIN — DILTIAZEM HYDROCHLORIDE 180 MG: 180 CAPSULE, COATED, EXTENDED RELEASE ORAL at 04:59

## 2025-01-08 RX ADMIN — DILTIAZEM HYDROCHLORIDE 120 MG: 180 CAPSULE, COATED, EXTENDED RELEASE ORAL at 04:57

## 2025-01-08 ASSESSMENT — ACTIVITIES OF DAILY LIVING (ADL)
ADLS_ACUITY_SCORE: 41

## 2025-01-08 ASSESSMENT — ENCOUNTER SYMPTOMS
COUGH: 1
COLOR CHANGE: 0
ABDOMINAL DISTENTION: 0
LIGHT-HEADEDNESS: 0
MYALGIAS: 0
DYSURIA: 0
BACK PAIN: 0
CONFUSION: 0
NUMBNESS: 0
VOMITING: 0
CHEST TIGHTNESS: 0
PALPITATIONS: 0
NAUSEA: 0
NECK PAIN: 0
FEVER: 0
SLEEP DISTURBANCE: 0
VOICE CHANGE: 0
FREQUENCY: 0
WEAKNESS: 0
HEADACHES: 0
WHEEZING: 0
ABDOMINAL PAIN: 0
BLOOD IN STOOL: 0
ANAL BLEEDING: 0
CHILLS: 0
DIZZINESS: 0
FLANK PAIN: 0
SHORTNESS OF BREATH: 1
DIAPHORESIS: 0

## 2025-01-08 ASSESSMENT — COLUMBIA-SUICIDE SEVERITY RATING SCALE - C-SSRS
1. IN THE PAST MONTH, HAVE YOU WISHED YOU WERE DEAD OR WISHED YOU COULD GO TO SLEEP AND NOT WAKE UP?: NO
6. HAVE YOU EVER DONE ANYTHING, STARTED TO DO ANYTHING, OR PREPARED TO DO ANYTHING TO END YOUR LIFE?: NO
2. HAVE YOU ACTUALLY HAD ANY THOUGHTS OF KILLING YOURSELF IN THE PAST MONTH?: NO

## 2025-01-08 NOTE — ED NOTES
Patient discharged from ED. AVS and medication reviewed and discussed with patient who verbalizes understanding. Denies additional questions. Ambulatory. VS charted. Denies pain.

## 2025-01-08 NOTE — ED TRIAGE NOTES
States that Tuesday morning he started having nasal congestion and drainage.  Styates that now he also has occasional shortness oif breath and it worsens when he lays down because of the nasal drainage draining back of throat.      Triage Assessment (Adult)       Row Name 01/08/25 0143          Triage Assessment    Airway WDL WDL

## 2025-01-09 LAB
BACTERIA BLD CULT: NORMAL
BACTERIA BLD CULT: NORMAL

## 2025-01-13 LAB
BACTERIA BLD CULT: NO GROWTH
BACTERIA BLD CULT: NO GROWTH

## 2025-06-04 ENCOUNTER — HOSPITAL ENCOUNTER (EMERGENCY)
Facility: HOSPITAL | Age: 80
Discharge: HOME | End: 2025-06-04
Attending: INTERNAL MEDICINE
Payer: COMMERCIAL

## 2025-06-04 ENCOUNTER — APPOINTMENT (OUTPATIENT)
Dept: GENERAL RADIOLOGY | Facility: HOSPITAL | Age: 80
End: 2025-06-04
Attending: INTERNAL MEDICINE
Payer: COMMERCIAL

## 2025-06-04 VITALS
OXYGEN SATURATION: 95 % | SYSTOLIC BLOOD PRESSURE: 138 MMHG | TEMPERATURE: 97.9 F | DIASTOLIC BLOOD PRESSURE: 81 MMHG | HEART RATE: 107 BPM | RESPIRATION RATE: 18 BRPM

## 2025-06-04 DIAGNOSIS — J20.9 ACUTE BRONCHITIS, UNSPECIFIED ORGANISM: ICD-10-CM

## 2025-06-04 PROCEDURE — 250N000013 HC RX MED GY IP 250 OP 250 PS 637: Performed by: INTERNAL MEDICINE

## 2025-06-04 PROCEDURE — 250N000009 HC RX 250: Performed by: INTERNAL MEDICINE

## 2025-06-04 PROCEDURE — 94640 AIRWAY INHALATION TREATMENT: CPT

## 2025-06-04 PROCEDURE — 99284 EMERGENCY DEPT VISIT MOD MDM: CPT | Performed by: INTERNAL MEDICINE

## 2025-06-04 PROCEDURE — 87637 SARSCOV2&INF A&B&RSV AMP PRB: CPT | Performed by: INTERNAL MEDICINE

## 2025-06-04 PROCEDURE — 250N000012 HC RX MED GY IP 250 OP 636 PS 637: Performed by: INTERNAL MEDICINE

## 2025-06-04 PROCEDURE — 71046 X-RAY EXAM CHEST 2 VIEWS: CPT | Mod: 26 | Performed by: INTERNAL MEDICINE

## 2025-06-04 PROCEDURE — 99284 EMERGENCY DEPT VISIT MOD MDM: CPT | Mod: 25

## 2025-06-04 PROCEDURE — 71046 X-RAY EXAM CHEST 2 VIEWS: CPT

## 2025-06-04 RX ORDER — PREDNISONE 20 MG/1
TABLET ORAL
Qty: 5 TABLET | Refills: 0 | Status: SHIPPED | OUTPATIENT
Start: 2025-06-04 | End: 2025-06-11

## 2025-06-04 RX ORDER — PREDNISONE 20 MG/1
40 TABLET ORAL ONCE
Status: COMPLETED | OUTPATIENT
Start: 2025-06-04 | End: 2025-06-04

## 2025-06-04 RX ORDER — IPRATROPIUM BROMIDE AND ALBUTEROL SULFATE 2.5; .5 MG/3ML; MG/3ML
3 SOLUTION RESPIRATORY (INHALATION) ONCE
Status: COMPLETED | OUTPATIENT
Start: 2025-06-04 | End: 2025-06-04

## 2025-06-04 RX ORDER — AZITHROMYCIN 250 MG/1
TABLET, FILM COATED ORAL
Qty: 6 TABLET | Refills: 0 | Status: SHIPPED | OUTPATIENT
Start: 2025-06-04 | End: 2025-06-09

## 2025-06-04 RX ORDER — AZITHROMYCIN 250 MG/1
500 TABLET, FILM COATED ORAL ONCE
Status: COMPLETED | OUTPATIENT
Start: 2025-06-04 | End: 2025-06-04

## 2025-06-04 RX ADMIN — PREDNISONE 40 MG: 20 TABLET ORAL at 22:04

## 2025-06-04 RX ADMIN — IPRATROPIUM BROMIDE AND ALBUTEROL SULFATE 3 ML: .5; 3 SOLUTION RESPIRATORY (INHALATION) at 20:22

## 2025-06-04 RX ADMIN — AZITHROMYCIN DIHYDRATE 500 MG: 250 TABLET ORAL at 22:04

## 2025-06-04 ASSESSMENT — COLUMBIA-SUICIDE SEVERITY RATING SCALE - C-SSRS
6. HAVE YOU EVER DONE ANYTHING, STARTED TO DO ANYTHING, OR PREPARED TO DO ANYTHING TO END YOUR LIFE?: NO
2. HAVE YOU ACTUALLY HAD ANY THOUGHTS OF KILLING YOURSELF IN THE PAST MONTH?: NO
1. IN THE PAST MONTH, HAVE YOU WISHED YOU WERE DEAD OR WISHED YOU COULD GO TO SLEEP AND NOT WAKE UP?: NO

## 2025-06-04 ASSESSMENT — ACTIVITIES OF DAILY LIVING (ADL)
ADLS_ACUITY_SCORE: 41

## 2025-06-05 NOTE — ED TRIAGE NOTES
Patient presents with c/o shortness of breath and cough that started last night. Denies any fevers or chills, reports nasal congestion. Patient reports coughing up thick sputum production.       Patient appears to be Afib, he reports that it is chronic X 4 years.

## 2025-06-05 NOTE — ED NOTES
AVS reviewed, all questions answered.   Rx sent to outside pharmacy.   Return to ED if needed &/or follow-up w/ PCP.  Declined discharge vitals.

## 2025-06-05 NOTE — ED NOTES
"Patient presents w/ increased SOB after mowing the grass yesterday, \"I might have over done it and the [place] fires don't help\".  Increase SOB w/ talking. Dry cough. Afebrile. Inspiratory wheezing, diminished lung sounds in all fields.   Denies chest pain/pressure/heaviness/tightness.  Hx of A-Fib.       "

## 2025-06-08 ASSESSMENT — ENCOUNTER SYMPTOMS
VOICE CHANGE: 0
VOMITING: 0
DYSURIA: 0
HEADACHES: 0
SHORTNESS OF BREATH: 0
COUGH: 1
LIGHT-HEADEDNESS: 0
FREQUENCY: 0
NECK PAIN: 0
ANAL BLEEDING: 0
ABDOMINAL PAIN: 0
DIZZINESS: 0
BLOOD IN STOOL: 0
SLEEP DISTURBANCE: 0
ABDOMINAL DISTENTION: 0
NAUSEA: 0
DIAPHORESIS: 0
MYALGIAS: 0
CHILLS: 0
BACK PAIN: 0
FLANK PAIN: 0
PALPITATIONS: 0
COLOR CHANGE: 0
CHEST TIGHTNESS: 0
NUMBNESS: 0
WHEEZING: 1
WEAKNESS: 0
CONFUSION: 0
FEVER: 0

## 2025-06-09 NOTE — ED PROVIDER NOTES
History     Chief Complaint   Patient presents with    Shortness of Breath    Cough     The history is provided by the patient.   Cough  Cough characteristics:  Productive  Sputum characteristics:  Nondescript  Severity:  Moderate  Onset quality:  Gradual  Duration:  2 days  Timing:  Intermittent  Progression:  Waxing and waning  Chronicity:  New  Associated symptoms: wheezing    Associated symptoms: no chest pain, no chills, no diaphoresis, no fever, no headaches, no myalgias, no rash and no shortness of breath        Allergies:  No Known Allergies    Problem List:    There are no active problems to display for this patient.       Past Medical History:    Past Medical History:   Diagnosis Date    Atrial fibrillation (H)     Unspecified cerebral artery occlusion with cerebral infarction 12/27/2012       Past Surgical History:    Past Surgical History:   Procedure Laterality Date    APPENDECTOMY OPEN N/A        Family History:    History reviewed. No pertinent family history.    Social History:  Marital Status:   [2]  Social History     Tobacco Use    Smoking status: Former    Smokeless tobacco: Former   Substance Use Topics    Alcohol use: No        Medications:    azithromycin (ZITHROMAX Z-GRETCHEN) 250 MG tablet  predniSONE (DELTASONE) 20 MG tablet  albuterol (PROAIR HFA/PROVENTIL HFA/VENTOLIN HFA) 108 (90 Base) MCG/ACT inhaler  apixaban ANTICOAGULANT (ELIQUIS) 5 MG tablet  ATENOLOL PO  DILTIAZEM HCL  LISINOPRIL PO  Simvastatin (ZOCOR PO)  vitamin C (ASCORBIC ACID) 1000 MG TABS          Review of Systems   Constitutional:  Negative for chills, diaphoresis and fever.   HENT:  Negative for voice change.    Eyes:  Negative for visual disturbance.   Respiratory:  Positive for cough and wheezing. Negative for chest tightness and shortness of breath.    Cardiovascular:  Negative for chest pain, palpitations and leg swelling.   Gastrointestinal:  Negative for abdominal distention, abdominal pain, anal bleeding, blood  in stool, nausea and vomiting.   Genitourinary:  Negative for decreased urine volume, dysuria, flank pain and frequency.   Musculoskeletal:  Negative for back pain, gait problem, myalgias and neck pain.   Skin:  Negative for color change, pallor and rash.   Neurological:  Negative for dizziness, syncope, weakness, light-headedness, numbness and headaches.   Psychiatric/Behavioral:  Negative for confusion, sleep disturbance and suicidal ideas.        Physical Exam   BP: 138/81  Pulse: 107  Temp: 97.9  F (36.6  C)  Resp: 18  SpO2: 97 %      Physical Exam  Vitals and nursing note reviewed.   Constitutional:       Appearance: He is well-developed.   HENT:      Head: Normocephalic and atraumatic.   Eyes:      Conjunctiva/sclera: Conjunctivae normal.      Pupils: Pupils are equal, round, and reactive to light.   Neck:      Thyroid: No thyromegaly.      Vascular: No JVD.      Trachea: No tracheal deviation.   Cardiovascular:      Rate and Rhythm: Normal rate and regular rhythm.      Heart sounds: Normal heart sounds. No murmur heard.     No gallop.   Pulmonary:      Effort: Pulmonary effort is normal. No respiratory distress.      Breath sounds: Normal breath sounds. No stridor. No wheezing or rales.   Chest:      Chest wall: No tenderness.   Abdominal:      General: Bowel sounds are normal. There is no distension.      Palpations: Abdomen is soft. There is no mass.      Tenderness: There is no abdominal tenderness. There is no guarding or rebound.   Musculoskeletal:         General: No tenderness. Normal range of motion.      Cervical back: Normal range of motion and neck supple.   Lymphadenopathy:      Cervical: No cervical adenopathy.   Skin:     General: Skin is warm.      Coloration: Skin is not pale.      Findings: No erythema or rash.   Neurological:      Mental Status: He is alert and oriented to person, place, and time.   Psychiatric:         Behavior: Behavior normal.         ED Course        Procedures                 No results found for this or any previous visit (from the past 24 hours).    Medications   ipratropium - albuterol 0.5 mg/2.5 mg/3 mL (DUONEB) neb solution 3 mL (3 mLs Nebulization $Given 6/4/25 2022)   predniSONE (DELTASONE) tablet 40 mg (40 mg Oral $Given 6/4/25 2204)   azithromycin (ZITHROMAX) tablet 500 mg (500 mg Oral $Given 6/4/25 2204)       Assessments & Plan (with Medical Decision Making)   Productive cough, wheezing  CXr: no infiltration  Zithro and prednisone started  Follow-up with PCP  I have reviewed the nursing notes.    I have reviewed the findings, diagnosis, plan and need for follow up with the patient.        Discharge Medication List as of 6/4/2025 10:05 PM        START taking these medications    Details   azithromycin (ZITHROMAX Z-GRETCHEN) 250 MG tablet Two tablets on the first day, then one tablet daily for the next 4 days, Disp-6 tablet, R-0, E-Prescribe      predniSONE (DELTASONE) 20 MG tablet 1 tab daily for 3 days, then 1/2 tab daily for 4 days, Disp-5 tablet, R-0, E-Prescribe             Final diagnoses:   Acute bronchitis, unspecified organism       6/4/2025   [facility]       Virgilio Charles MD  06/08/25 [id number]